# Patient Record
Sex: MALE | Race: OTHER | NOT HISPANIC OR LATINO | URBAN - METROPOLITAN AREA
[De-identification: names, ages, dates, MRNs, and addresses within clinical notes are randomized per-mention and may not be internally consistent; named-entity substitution may affect disease eponyms.]

---

## 2021-07-01 ENCOUNTER — INPATIENT (INPATIENT)
Facility: HOSPITAL | Age: 71
LOS: 2 days | Discharge: HOME | DRG: 287 | End: 2021-07-04
Attending: INTERNAL MEDICINE | Admitting: INTERNAL MEDICINE
Payer: COMMERCIAL

## 2021-07-01 VITALS
OXYGEN SATURATION: 88 % | WEIGHT: 210.1 LBS | RESPIRATION RATE: 17 BRPM | HEIGHT: 71 IN | HEART RATE: 63 BPM | SYSTOLIC BLOOD PRESSURE: 115 MMHG | DIASTOLIC BLOOD PRESSURE: 79 MMHG | TEMPERATURE: 97 F

## 2021-07-01 DIAGNOSIS — Z89.029 ACQUIRED ABSENCE OF UNSPECIFIED FINGER(S): Chronic | ICD-10-CM

## 2021-07-01 LAB
ALBUMIN SERPL ELPH-MCNC: 4.5 G/DL — SIGNIFICANT CHANGE UP (ref 3.3–5)
ALP SERPL-CCNC: 86 U/L — SIGNIFICANT CHANGE UP (ref 40–120)
ALT FLD-CCNC: 15 U/L — SIGNIFICANT CHANGE UP (ref 10–45)
ANION GAP SERPL CALC-SCNC: 15 MMOL/L — SIGNIFICANT CHANGE UP (ref 5–17)
AST SERPL-CCNC: 14 U/L — SIGNIFICANT CHANGE UP (ref 10–40)
BASOPHILS # BLD AUTO: 0.05 K/UL — SIGNIFICANT CHANGE UP (ref 0–0.2)
BASOPHILS NFR BLD AUTO: 0.7 % — SIGNIFICANT CHANGE UP (ref 0–2)
BILIRUB SERPL-MCNC: 0.2 MG/DL — SIGNIFICANT CHANGE UP (ref 0.2–1.2)
BUN SERPL-MCNC: 38 MG/DL — HIGH (ref 7–23)
CALCIUM SERPL-MCNC: 9.4 MG/DL — SIGNIFICANT CHANGE UP (ref 8.4–10.5)
CHLORIDE SERPL-SCNC: 99 MMOL/L — SIGNIFICANT CHANGE UP (ref 96–108)
CO2 SERPL-SCNC: 19 MMOL/L — LOW (ref 22–31)
CREAT SERPL-MCNC: 1.36 MG/DL — HIGH (ref 0.5–1.3)
EOSINOPHIL # BLD AUTO: 0.34 K/UL — SIGNIFICANT CHANGE UP (ref 0–0.5)
EOSINOPHIL NFR BLD AUTO: 4.4 % — SIGNIFICANT CHANGE UP (ref 0–6)
GLUCOSE SERPL-MCNC: 96 MG/DL — SIGNIFICANT CHANGE UP (ref 70–99)
HCT VFR BLD CALC: 45 % — SIGNIFICANT CHANGE UP (ref 39–50)
HGB BLD-MCNC: 14.8 G/DL — SIGNIFICANT CHANGE UP (ref 13–17)
IMM GRANULOCYTES NFR BLD AUTO: 0.5 % — SIGNIFICANT CHANGE UP (ref 0–1.5)
LYMPHOCYTES # BLD AUTO: 1.85 K/UL — SIGNIFICANT CHANGE UP (ref 1–3.3)
LYMPHOCYTES # BLD AUTO: 24.2 % — SIGNIFICANT CHANGE UP (ref 13–44)
MCHC RBC-ENTMCNC: 29.7 PG — SIGNIFICANT CHANGE UP (ref 27–34)
MCHC RBC-ENTMCNC: 32.9 GM/DL — SIGNIFICANT CHANGE UP (ref 32–36)
MCV RBC AUTO: 90.2 FL — SIGNIFICANT CHANGE UP (ref 80–100)
MONOCYTES # BLD AUTO: 0.65 K/UL — SIGNIFICANT CHANGE UP (ref 0–0.9)
MONOCYTES NFR BLD AUTO: 8.5 % — SIGNIFICANT CHANGE UP (ref 2–14)
NEUTROPHILS # BLD AUTO: 4.73 K/UL — SIGNIFICANT CHANGE UP (ref 1.8–7.4)
NEUTROPHILS NFR BLD AUTO: 61.7 % — SIGNIFICANT CHANGE UP (ref 43–77)
NRBC # BLD: 0 /100 WBCS — SIGNIFICANT CHANGE UP (ref 0–0)
NT-PROBNP SERPL-SCNC: 62 PG/ML — SIGNIFICANT CHANGE UP (ref 0–300)
PLATELET # BLD AUTO: 226 K/UL — SIGNIFICANT CHANGE UP (ref 150–400)
POTASSIUM SERPL-MCNC: 4.6 MMOL/L — SIGNIFICANT CHANGE UP (ref 3.5–5.3)
POTASSIUM SERPL-SCNC: 4.6 MMOL/L — SIGNIFICANT CHANGE UP (ref 3.5–5.3)
PROT SERPL-MCNC: 7.2 G/DL — SIGNIFICANT CHANGE UP (ref 6–8.3)
RBC # BLD: 4.99 M/UL — SIGNIFICANT CHANGE UP (ref 4.2–5.8)
RBC # FLD: 11.9 % — SIGNIFICANT CHANGE UP (ref 10.3–14.5)
SARS-COV-2 RNA SPEC QL NAA+PROBE: SIGNIFICANT CHANGE UP
SODIUM SERPL-SCNC: 133 MMOL/L — LOW (ref 135–145)
TROPONIN T, HIGH SENSITIVITY RESULT: 12 NG/L — SIGNIFICANT CHANGE UP (ref 0–51)
TROPONIN T, HIGH SENSITIVITY RESULT: 13 NG/L — SIGNIFICANT CHANGE UP (ref 0–51)
WBC # BLD: 7.66 K/UL — SIGNIFICANT CHANGE UP (ref 3.8–10.5)
WBC # FLD AUTO: 7.66 K/UL — SIGNIFICANT CHANGE UP (ref 3.8–10.5)

## 2021-07-01 PROCEDURE — 99285 EMERGENCY DEPT VISIT HI MDM: CPT | Mod: 25

## 2021-07-01 PROCEDURE — 93010 ELECTROCARDIOGRAM REPORT: CPT

## 2021-07-01 PROCEDURE — 71046 X-RAY EXAM CHEST 2 VIEWS: CPT | Mod: 26

## 2021-07-01 RX ORDER — ASPIRIN/CALCIUM CARB/MAGNESIUM 324 MG
325 TABLET ORAL DAILY
Refills: 0 | Status: DISCONTINUED | OUTPATIENT
Start: 2021-07-01 | End: 2021-07-04

## 2021-07-01 RX ORDER — LISINOPRIL 2.5 MG/1
12.5 TABLET ORAL AT BEDTIME
Refills: 0 | Status: DISCONTINUED | OUTPATIENT
Start: 2021-07-01 | End: 2021-07-04

## 2021-07-01 RX ORDER — CILOSTAZOL 100 MG/1
50 TABLET ORAL ONCE
Refills: 0 | Status: COMPLETED | OUTPATIENT
Start: 2021-07-01 | End: 2021-07-01

## 2021-07-01 RX ORDER — ATORVASTATIN CALCIUM 80 MG/1
10 TABLET, FILM COATED ORAL AT BEDTIME
Refills: 0 | Status: DISCONTINUED | OUTPATIENT
Start: 2021-07-01 | End: 2021-07-03

## 2021-07-01 RX ORDER — SODIUM CHLORIDE 9 MG/ML
1000 INJECTION INTRAMUSCULAR; INTRAVENOUS; SUBCUTANEOUS ONCE
Refills: 0 | Status: COMPLETED | OUTPATIENT
Start: 2021-07-01 | End: 2021-07-01

## 2021-07-01 RX ADMIN — Medication 325 MILLIGRAM(S): at 23:31

## 2021-07-01 RX ADMIN — SODIUM CHLORIDE 1000 MILLILITER(S): 9 INJECTION INTRAMUSCULAR; INTRAVENOUS; SUBCUTANEOUS at 17:38

## 2021-07-01 RX ADMIN — LISINOPRIL 12.5 MILLIGRAM(S): 2.5 TABLET ORAL at 23:32

## 2021-07-01 RX ADMIN — CILOSTAZOL 50 MILLIGRAM(S): 100 TABLET ORAL at 23:47

## 2021-07-01 NOTE — ED PROVIDER NOTE - PHYSICAL EXAMINATION
GENERAL: well appearing in no acute distress, non-toxic appearing  HEAD: normocephalic, atraumatic  HENT: airway intact, neck supple  EYES: normal conjunctiva, PERRL  CARDIAC: regular rate and rhythm, normal S1S2, no appreciable murmurs, 2+ pulses in UE/LE b/l  PULM: normal breath sounds, clear to ascultation bilaterally, no rales, rhonchi, wheezing  GI: abdomen nondistended, soft, nontender, no guarding, rebound tenderness  NEURO: no focal motor or sensory deficits, CN2-12 intact, normal gait, AAOx3  MSK: no peripheral edema, no calf tenderness b/l  SKIN: well-perfused, extremities warm, no visible rashes  PSYCH: appropriate mood and affect

## 2021-07-01 NOTE — ED CDU PROVIDER INITIAL DAY NOTE - PROGRESS NOTE DETAILS
Pt comfortable. No complaints. Vital signs stable. echo normal. stress test shows ischemia. cardiology consult called. pts daughter at bedside. -Letty Francisco PA-C Pt comfortable. No complaints. Vital signs stable. Will continue to observe and reassess. Awaiting echo and stress test. -Letty Francisco PA-C pt seen by cardiology fellow. recommends cardiac cath. will admit pt. discussed with Dr. Denis. -Letty Francisco PA-C

## 2021-07-01 NOTE — ED CDU PROVIDER INITIAL DAY NOTE - NS ED ROS FT
Constitutional: No fever or chills  Eyes: No visual changes  CV: +chest pain No lower extremity edema  Resp: +Chronic SOB no cough  GI: No abd pain. +nausea No vomiting. + diarrhea.   : No dysuria, hematuria.   MSK: No musculoskeletal pain  Skin: No rash  Psych: No complaints   Neuro: +lightheadedness No headache. No numbness or tingling.

## 2021-07-01 NOTE — ED CDU PROVIDER INITIAL DAY NOTE - FAMILY HISTORY
Mother  Still living? Unknown  Family history of CHF (congestive heart failure), Age at diagnosis: Age Unknown

## 2021-07-01 NOTE — ED CDU PROVIDER INITIAL DAY NOTE - PHYSICAL EXAMINATION
GEN: Well Appearing, Nontoxic, NAD  HEENT: NC/AT, Symm Facies. PERRL  CV: No JVD/Bruits or stridor;  +S1S2, RRR w/o m/g/r  RESP: CTAB w/o w/r/r  ABD: Soft, nt/nd  EXT/MSK: No lower extremity edema or calf tenderness.+ equal palpable pulses. FROMx4  PSYCH: Appropriate mood and affect   Neuro: Grossly intact, AOX3 with normal speech GEN: Well Appearing, Nontoxic, NAD  HEENT: NC/AT, Symm Facies. EOMI  CV: No JVD/Bruits or stridor;  +S1S2, RRR w/o m/g/r  RESP: CTAB w/o w/r/r  ABD: Soft, nt/nd  EXT/MSK: No lower extremity edema or calf tenderness.+ equal palpable pulses. FROMx4  PSYCH: Appropriate mood and affect   Neuro: Grossly intact, AOX3 with normal speech

## 2021-07-01 NOTE — ED CDU PROVIDER INITIAL DAY NOTE - ATTENDING CONTRIBUTION TO CARE
Attending Statement (PIPE Ellison MD):    HPI: 70Y/O M with h/o COPD, ETOH abuse, and CAD c/o substernal chest pain occurring intermittently;   not a/w resting, exertion or food for the past 4 days; improving since started.  no fever/chills, no cough, no SOB. no n/v. No home Oxygen use.  No leg swelling.    PI# [id number]    Review of Systems:  -General: no fever or chills  -ENT: no congestion, no difficulty swallowing  -Pulmonary: no cough, no shortness of breath  -Cardiac: no chest pain, no palpitations  -Gastrointestinal: no abdominal pain, no nausea, no vomiting, and no diarrhea.  -Genitourinary: no blood or pain with urination  -Musculoskeletal: no back or neck pain  -Skin: no rashes  -Endocrine: No h/o diabetes or thyroid disease  -Neurologic: No focal weakness or numbness    All else negative unless otherwise specified elsewhere in this note.    PSH/PMH as noted above    On Physical Exam:  General: well appearing, in NAD, speaking clearly in full sentences and without difficulty; cooperative with exam  HEENT: airway patent  Neck: no neck tenderness, no nuchal rigidity  Cardiac: normal s1, s2; RRR; no MGR  Lungs: CTABL  Abdomen: soft nontender/nondistended  : no bladder tenderness or distension  Skin: intact, no rash  Extremities: no peripheral edema, no gross deformities  Neuro: no gross neurologic deficits    MDM: 72y/o M with h/o COPD, CAD, HTN, c/o chest pain:  Patient's description of chest pain, including lack of pleuritic nature, minimal risk factors and overall clinical picture are not consistent with a pulmonary embolism. The patient's clinical presentation, including type/description of pain, stable vitals and overall clinical picture are not consistent with an acute aortic dissection. No fever, cough or other symptoms suggestive of an acute infectious etiology.  Some concern for ACS/angina; will obtain labs: cbc (to evaluate for leukocytosis or anemia), CMP (to evaluate for electrolyte abnormalities or renal/liver dysfunction) and troponin.    ED course: patient remains stable; trop not significantly elevated x 2; cxr clear lungs; however, initially wanting to leave, then had episode of chest discomfort when walking to bathroom and agreed to stay in CDU for further evaluation: observation, tele monitoring and stress/echo.

## 2021-07-01 NOTE — ED PROVIDER NOTE - OBJECTIVE STATEMENT
72yo M w/ history of HTN, HLD, ETOH abuse, CAD s/p cath but no stents in 2018, and COPD coming to the ED for left sided non radiating intermittent dull/sharp chest pain x4 days. Polish speaking man who prefers daughter to translate. Pain is non specific and occurs when at rest or upon exertion. Pt also notes occasional lightheadedness, and feeling "off balance" with an unsteady gait, along with nausea,  Currently asymptomatic. Notes SOB occasionally but had symptomatic relief when using albuterol inhalar. Denies any headache, changes in vision, abdominal pain, or dysuria.

## 2021-07-01 NOTE — ED ADULT NURSE NOTE - OBJECTIVE STATEMENT
71yr old male w/ pmhx of BPH, HTN, HLD, ETOH abuse and COPD presents to ED c/o chest pain. Pt accompanied by daughter who states pt has been experiencing Mid-left sided chest pain for the past 4 days. 71yr old male w/ pmhx of BPH, HTN, HLD, ETOH abuse and COPD presents to ED c/o chest pain. Pt accompanied by daughter who states pt has been experiencing Mid-left sided chest pain for the past 4 days. Pt states the CP feels like a non-radiating intermittent dull pain, that occurs when at rest or upon exertion. Pt also endorses lightheadedness, and feeling "off balance" with an unsteady gait, along with nausea, pt states these symptoms occur intermittently and he is not currently experiencing them. Pt states he did experience some SOB, but states that was due to his non compliance with his inhaler for COPD. Pt denies Vomiting, diarrhea, numbness, tingling of extremities, fevers, chills, syncope, diaphoresis. Pt placed on CM, with a HR of 67 in NSR. Pt assessed by MD, bed lowered and locked, call bell within reach. will reassess

## 2021-07-01 NOTE — ED PROVIDER NOTE - PROGRESS NOTE DETAILS
Gurjit, PGY2: Patient reassessed and noting he's had intermittent chest pain while in the ED; will get 2nd trop and reassess. Offered CDU vs admission for stress/echo but patient is reluctant to stay Attending note (Scot): The patient is clinically sober, A&Ox3, free from distracting injury.  Throughout our interactions in the ED today, the pt has demonstrated concrete thinking/reasoning, has maintained an orderly/reasonable conversation, appears to have intact insight/judgment/reason and therefore in our opinion has capacity to make decisions.  Given the patient's presentation, we have communicated our concern for coronary artery disease, acs, heart attack, death.  The patient has verbalized an understanding of our concerns and still requests to leave against medical advice.  We have discussed the range of possible diagnoses, potential testing & treatment options.  We’ve made  numerous efforts to prevent the patient from leaving AMA.  Our discussions included the potential outcomes of leaving AMA, including worsening of their condition, becoming permanently disabled/in pain/critically ill, or death.  Despite these efforts, we were unable to convince the patient to stay.  The patient is refusing any  further care and is leaving against medical advice.  We have answered all questions and have implored the patient to return ASAP to complete the work up or with ANY worsening or new concerning symptoms. Attending note (Scot): The patient is clinically sober, A&Ox3, free from distracting injury.  Throughout our interactions in the ED today, the pt has demonstrated concrete thinking/reasoning, has maintained an orderly/reasonable conversation, appears to have intact insight/judgment/reason and therefore in our opinion has capacity to make decisions.  Given the patient's presentation, we have communicated our concern for coronary artery disease, acs, heart attack, death.  The patient has verbalized an understanding of our concerns and still requests to leave against medical advice.  We have discussed the range of possible diagnoses, potential testing & treatment options.  We’ve made  numerous efforts to prevent the patient from leaving AMA.  Our discussions included the potential outcomes of leaving AMA, including worsening of their condition, becoming permanently disabled/in pain/critically ill, or death.  Despite these efforts, we were unable to convince the patient to stay.  The patient is refusing any  further care and is leaving against medical advice.  We have answered all questions and have implored the patient to return ASAP to complete the work up or with ANY worsening or new concerning symptoms.    PI# [id number] KATELYN Ramires (Resident) - pt ambulated to bathroom, had CP, agreeable now to staying in CDU for stress and echo.

## 2021-07-01 NOTE — ED PROVIDER NOTE - ATTENDING CONTRIBUTION TO CARE
Attending Statement (PIPE Ellison MD):    HPI: 70Y/O M with h/o COPD, ETOH abuse, and CAD c/o substernal chest pain occurring intermittently;   not a/w resting, exertion or food for the past 4 days; improving since started.  no fever/chills, no cough, no SOB. no n/v. No home Oxygen use.  No leg swelling.    PI# [id number]    Review of Systems:  -General: no fever or chills  -ENT: no congestion, no difficulty swallowing  -Pulmonary: no cough, no shortness of breath  -Cardiac: no chest pain, no palpitations  -Gastrointestinal: no abdominal pain, no nausea, no vomiting, and no diarrhea.  -Genitourinary: no blood or pain with urination  -Musculoskeletal: no back or neck pain  -Skin: no rashes  -Endocrine: No h/o diabetes or thyroid disease  -Neurologic: No focal weakness or numbness    All else negative unless otherwise specified elsewhere in this note.    PSH/PMH as noted above    On Physical Exam:  General: well appearing, in NAD, speaking clearly in full sentences and without difficulty; cooperative with exam  HEENT: airway patent  Neck: no neck tenderness, no nuchal rigidity  Cardiac: normal s1, s2; RRR; no MGR  Lungs: CTABL  Abdomen: soft nontender/nondistended  : no bladder tenderness or distension  Skin: intact, no rash  Extremities: no peripheral edema, no gross deformities  Neuro: no gross neurologic deficits    MDM: 70y/o M with h/o COPD, CAD, HTN, c/o chest pain:  Patient's description of chest pain, including lack of pleuritic nature, minimal risk factors and overall clinical picture are not consistent with a pulmonary embolism. The patient's clinical presentation, including type/description of pain, stable vitals and overall clinical picture are not consistent with an acute aortic dissection. No fever, cough or other symptoms suggestive of an acute infectious etiology.  Some concern for ACS/angina; will obtain labs: cbc (to evaluate for leukocytosis or anemia), CMP (to evaluate for electrolyte abnormalities or renal/liver dysfunction) and troponin. Attending Statement (PIPE Ellison MD):    HPI: 70Y/O M with h/o COPD, ETOH abuse, and CAD c/o substernal chest pain occurring intermittently;   not a/w resting, exertion or food for the past 4 days; improving since started.  no fever/chills, no cough, no SOB. no n/v. No home Oxygen use.  No leg swelling.    PI# [id number]    Review of Systems:  -General: no fever or chills  -ENT: no congestion, no difficulty swallowing  -Pulmonary: no cough, no shortness of breath  -Cardiac: no chest pain, no palpitations  -Gastrointestinal: no abdominal pain, no nausea, no vomiting, and no diarrhea.  -Genitourinary: no blood or pain with urination  -Musculoskeletal: no back or neck pain  -Skin: no rashes  -Endocrine: No h/o diabetes or thyroid disease  -Neurologic: No focal weakness or numbness    All else negative unless otherwise specified elsewhere in this note.    PSH/PMH as noted above    On Physical Exam:  General: well appearing, in NAD, speaking clearly in full sentences and without difficulty; cooperative with exam  HEENT: airway patent  Neck: no neck tenderness, no nuchal rigidity  Cardiac: normal s1, s2; RRR; no MGR  Lungs: CTABL  Abdomen: soft nontender/nondistended  : no bladder tenderness or distension  Skin: intact, no rash  Extremities: no peripheral edema, no gross deformities  Neuro: no gross neurologic deficits    MDM: 70y/o M with h/o COPD, CAD, HTN, c/o chest pain:  Patient's description of chest pain, including lack of pleuritic nature, minimal risk factors and overall clinical picture are not consistent with a pulmonary embolism. The patient's clinical presentation, including type/description of pain, stable vitals and overall clinical picture are not consistent with an acute aortic dissection. No fever, cough or other symptoms suggestive of an acute infectious etiology.  Some concern for ACS/angina; will obtain labs: cbc (to evaluate for leukocytosis or anemia), CMP (to evaluate for electrolyte abnormalities or renal/liver dysfunction) and troponin.    ED course: patient remains stable; trop not significantly elevated x 2; cxr clear lungs; however, initially wanting to leave, then had episode of chest discomfort when walking to bathroom and agreed to stay in CDU for further evaluation: observation, tele monitoring and stress/echo.

## 2021-07-01 NOTE — ED ADULT NURSE NOTE - NSIMPLEMENTINTERV_GEN_ALL_ED
Implemented All Fall Risk Interventions:  [place] to call system. Call bell, personal items and telephone within reach. Instruct patient to call for assistance. Room bathroom lighting operational. Non-slip footwear when patient is off stretcher. Physically safe environment: no spills, clutter or unnecessary equipment. Stretcher in lowest position, wheels locked, appropriate side rails in place. Provide visual cue, wrist band, yellow gown, etc. Monitor gait and stability. Monitor for mental status changes and reorient to person, place, and time. Review medications for side effects contributing to fall risk. Reinforce activity limits and safety measures with patient and family.

## 2021-07-01 NOTE — ED PROVIDER NOTE - NS ED ROS FT
General: denies fever, chills  HENT: denies nasal congestion, rhinorrhea  Eyes: denies visual changes, blurred vision  CV: positive chest pain, no palpitations  Resp: positive difficulty breathing, no cough  Abdominal: denies nausea, vomiting, diarrhea, abdominal pain  : denies urinary pain or discharge  MSK: denies muscle aches, leg swelling  Neuro: denies headaches, numbness, tingling  Skin: denies rashes, bruises

## 2021-07-01 NOTE — ED PROVIDER NOTE - CLINICAL SUMMARY MEDICAL DECISION MAKING FREE TEXT BOX
1yo M w/ history of HTN, HLD, ETOH abuse and COPD coming to the ED for left sided non radiating intermittent dull/sharp chest pain x4 days; likely angina vs unstable angina. Will evaluate for ACS; likely would benefit for echo + stress test as last cardiologist appointment was over 3 years ago.  Plan: labs + ekg + chest xray + IV fluid hydration 72yo M w/ history of HTN, HLD, ETOH abuse and COPD coming to the ED for left sided non radiating intermittent dull/sharp chest pain x4 days; likely angina vs unstable angina. Will evaluate for ACS; likely would benefit for echo + stress test as last cardiologist appointment was over 3 years ago.  Plan: labs + ekg + chest xray + IV fluid hydration

## 2021-07-01 NOTE — ED CDU PROVIDER INITIAL DAY NOTE - OBJECTIVE STATEMENT
70yo M w/ history of HTN, HLD, ETOH abuse (last drink 1 week ago, denies history of withdrawal), COPD, daily smoker (declines nicotine patch) CAD s/p cath but no stents in 2018, and COPD coming to the ED for left sided non radiating intermittent dull/sharp chest pain x4 days. Polish speaking man who prefers daughter to translate. Daughter Iva [phone]. Pain is non specific intermittent and occurs when at rest or upon exertion. Pt also notes occasional lightheadedness, and feeling "off balance" with an unsteady gait, along with nausea, and looser stools for the last 4 days.  States that his systolic BP in at home was in the 70s today. Currently asymptomatic. Notes SOB occasionally but had symptomatic relief when using albuterol inhalar, unchanged from baseline. Denies any headache, changes in vision, abdominal pain, or dysuria. No cardiologist. PCP Kati.   ED course: Vitals stable. Trop 13-12, BNP 62. Patient was going to AMA but then had another episode of chest pain, resolving shortly afterwards, then agreeing to stay for echo and stress test. Plan for tele monitoring and cardiac work up in CDU.

## 2021-07-01 NOTE — ED ADULT NURSE REASSESSMENT NOTE - NS ED NURSE REASSESS COMMENT FT1
Pt. received from TRISHA Costa from [facility] 3 into red 39.  iPad being used at bedside, ID [id number], to communicate in polish with patient. Pt. AOx4, ambulates independently, reporting intermittent chest pain. Sinus bradycardia on cardiac monitor, all other VSS. Made aware of plan of care for echo in the am. Pt. complaining about cardiac monitor wires, educated on how to disconnect and reconnect monitor so he can ambulate and use the restroom when necessary. Will continue to reassess.

## 2021-07-01 NOTE — ED ADULT NURSE REASSESSMENT NOTE - NS ED NURSE REASSESS COMMENT FT1
Assumed care from RN Kristina @ 1900. On assessment, pt is A&Ox3 speaking coherently in Polish, family member at bedside translating as per pt request. ID verified. Pt c/o mild back pain "from stretcher". No other c/o pain/discomfort. Denies any chest pain/SOB. Does not appear to be in any acute distress. On cardiac monitor, indicating sinus bradycardia. Fall precautions in place, pt and family educated. PIV patent and flushing w/o difficulty, no s/s infection/infiltration. Rpt troponin drawn, pt and family member updated on plan of care, all questions answered, verbalize understanding. Safety and comfort measures provided. Bed locked and in lowest position, side rails up for safety. Call bell within reach. Assumed care from RN Kristina @ 1900. On assessment, pt is A&Ox3 speaking coherently in Polish, family member at bedside translating as per pt request. ID verified. Pt c/o mild back pain "from stretcher". No other c/o pain/discomfort. Denies any chest pain/SOB. Does not appear to be in any acute distress. On cardiac monitor and continuous pulse oximetry, indicating sinus bradycardia. Fall precautions in place, pt and family educated. PIV patent and flushing w/o difficulty, no s/s infection/infiltration. Rpt troponin drawn, pt and family member updated on plan of care, all questions answered, verbalize understanding. Safety and comfort measures provided. Bed locked and in lowest position, side rails up for safety. Call bell within reach.

## 2021-07-02 DIAGNOSIS — R07.9 CHEST PAIN, UNSPECIFIED: ICD-10-CM

## 2021-07-02 LAB
A1C WITH ESTIMATED AVERAGE GLUCOSE RESULT: 5.7 % — HIGH (ref 4–5.6)
CHOLEST SERPL-MCNC: 158 MG/DL — SIGNIFICANT CHANGE UP
ESTIMATED AVERAGE GLUCOSE: 117 MG/DL — HIGH (ref 68–114)
HDLC SERPL-MCNC: 50 MG/DL — SIGNIFICANT CHANGE UP
LIPID PNL WITH DIRECT LDL SERPL: 85 MG/DL — SIGNIFICANT CHANGE UP
NON HDL CHOLESTEROL: 108 MG/DL — SIGNIFICANT CHANGE UP
TRIGL SERPL-MCNC: 117 MG/DL — SIGNIFICANT CHANGE UP

## 2021-07-02 PROCEDURE — 99236 HOSP IP/OBS SAME DATE HI 85: CPT

## 2021-07-02 PROCEDURE — 93306 TTE W/DOPPLER COMPLETE: CPT | Mod: 26

## 2021-07-02 PROCEDURE — 78452 HT MUSCLE IMAGE SPECT MULT: CPT | Mod: 26

## 2021-07-02 PROCEDURE — 93018 CV STRESS TEST I&R ONLY: CPT

## 2021-07-02 PROCEDURE — 93016 CV STRESS TEST SUPVJ ONLY: CPT

## 2021-07-02 RX ORDER — ALFUZOSIN HYDROCHLORIDE 10 MG/1
1 TABLET, EXTENDED RELEASE ORAL
Qty: 0 | Refills: 0 | DISCHARGE

## 2021-07-02 RX ORDER — LISINOPRIL 2.5 MG/1
12.5 TABLET ORAL
Qty: 0 | Refills: 0 | DISCHARGE

## 2021-07-02 RX ORDER — CILOSTAZOL 100 MG/1
1 TABLET ORAL
Qty: 0 | Refills: 0 | DISCHARGE

## 2021-07-02 RX ORDER — ASPIRIN/CALCIUM CARB/MAGNESIUM 324 MG
0 TABLET ORAL
Qty: 0 | Refills: 0 | DISCHARGE

## 2021-07-02 RX ADMIN — LISINOPRIL 12.5 MILLIGRAM(S): 2.5 TABLET ORAL at 22:06

## 2021-07-02 RX ADMIN — ATORVASTATIN CALCIUM 10 MILLIGRAM(S): 80 TABLET, FILM COATED ORAL at 20:09

## 2021-07-02 RX ADMIN — Medication 325 MILLIGRAM(S): at 20:08

## 2021-07-02 NOTE — ED CDU PROVIDER SUBSEQUENT DAY NOTE - PHYSICAL EXAMINATION
GEN: Well Appearing, Nontoxic, NAD  HEENT: NC/AT, Symm Facies. PERRL  CV: No JVD/Bruits or stridor;  +S1S2, RRR w/o m/g/r  RESP: CTAB w/o w/r/r  ABD: Soft, nt/nd  EXT/MSK: No lower extremity edema or calf tenderness.+ equal palpable pulses. FROMx4  PSYCH: Appropriate mood and affect   Neuro: Grossly intact, AOX3 with normal speech

## 2021-07-02 NOTE — ED ADULT NURSE REASSESSMENT NOTE - NS ED NURSE REASSESS COMMENT FT1
Pt. remains refusing vital signs at this time. Becoming angry when EDT attempted temperature and vital sign assessments. JOSE Fisher made aware. Will continue to reassess.

## 2021-07-02 NOTE — CONSULT NOTE ADULT - SUBJECTIVE AND OBJECTIVE BOX
Patient seen and evaluated at bedside    Reason for consult:  positive stress test    HPI:  71M w/ HTN, HLD, ETOH abuse, CAD s/p cath at [facility] in 2018 (Daughter, Iva used to be a cath PA there; has known LAD, CX and RCA lesions, but 20-30%, did not get intervened on) and COPD who presented with left sided non radiating chest pain for 4 days that only occurs with exertion. He has had chest pain at baseline due to his COPD, but this pain was different in nature in regards to intensity. He was ruled out for ACS, ECG showed NSR. He underwent stress test today that showed mild to moderate defects in inferior and inferoseptal, basal anterior, basal anteroseptal walls that are reversible consistent with ischemia. Currently, he is asymptomatic. Echo today showed normal LV function without wall motion abnormalities.       PMHx:   Hypertension    COPD (chronic obstructive pulmonary disease)    ETOH abuse        PSHx:   Finger amputee        Allergies:  No Known Allergies      Home Meds:  ASA  Statin  ACE-i    Current Medications:   aspirin enteric coated 325 milliGRAM(s) Oral daily  atorvastatin 10 milliGRAM(s) Oral at bedtime  lisinopril 12.5 milliGRAM(s) Oral at bedtime    FAMILY HISTORY:  Family history of CHF (congestive heart failure) (Mother)    Social History: NA    Review of Systems:  REVIEW OF SYSTEMS:  CONSTITUTIONAL: No weakness, fevers or chills  EYES/ENT: No visual changes;  No dysphagia  NECK: No pain or stiffness  RESPIRATORY: No cough, wheezing, hemoptysis; No shortness of breath  CARDIOVASCULAR: see above  GASTROINTESTINAL: No abdominal or epigastric pain. No nausea, vomiting, or hematemesis; No diarrhea or constipation. No melena or hematochezia.  BACK: No back pain  GENITOURINARY: No dysuria, frequency or hematuria  NEUROLOGICAL: No numbness or weakness  SKIN: No itching, burning, rashes, or lesions   All other review of systems is negative unless indicated above.    [x ] All other systems negative  [ ] Unable to assess ROS due to    Physical Exam:  T(F): 97.5 (-), Max: 98.2 ()  HR: 77 () (58 - 77)  BP: 101/74 () (96/60 - 149/71)  RR: 22 ()  SpO2: 96% ()  GENERAL: No acute distress, well-developed  HEAD:  Atraumatic, Normocephalic  ENT: EOMI, PERRLA, conjunctiva and sclera clear, Neck supple, No JVD, moist mucosa  CHEST/LUNG: Clear to auscultation bilaterally; No wheeze, equal breath sounds bilaterally   BACK: No spinal tenderness  HEART: Regular rate and rhythm; No murmurs, rubs, or gallops  ABDOMEN: Soft, Nontender, Nondistended; Bowel sounds present  EXTREMITIES:  No clubbing, cyanosis, or edema  PSYCH: Nl behavior, nl affect  NEUROLOGY: AAOx3, non-focal, cranial nerves intact  SKIN: Normal color, No rashes or lesions  LINES:    Cardiovascular Diagnostic Testing:    < from: Transthoracic Echocardiogram (21 @ 09:56) >  1. Normal left ventricular internal dimensions and wall  thicknesses.  2. Normal left ventricular systolic function. No segmental  wall motion abnormalities.  3. Normal right ventricular size and systolic function.  ------------------------------------------------------------------------  Confirmed on  2021 - 13:45:33 by MARIA LUISA Steele  ------------------------------------------------------------------------    < end of copied text >  < from: Nuclear Stress Test-Pharmacologic (Nuclear Stress Test-Pharmacologic .) (21 @ 13:45) >  IMPRESSIONS:Abnormal Study  * Chest Pain: No chest pain with administration of  Regadenoson.  * Symptom: No Symptom.  * HR Response: Appropriate.  * BP Response: Appropriate.  * Heart Rhythm: Normal.  * Baseline ECG: Normal.  * ECG Abnormalities: None.  * ECG Changes: No ischemic changes.  * Arrhythmia: None.  * The left ventricle was normal in size. There are medium  sized, mild to moderate defects in inferior and  inferoseptal, basal anterior, basal anteroseptal walls  that are reversible consistent with ischemia.  * Gated wall motion analysis is performed, and shows  normal wall motion with post stress LVEF of 67% and post  stress LVEDV of  95 ml.  ------------------------------------------------------------------------  Confirmed on  2021 - 17:19:53 by Wesley Styles M.D.  ------------------------------------------------------------------------    < end of copied text >    CXR: Personally reviewed    Labs: Personally reviewed                        14.8   7.66  )-----------( 226      ( 2021 17:30 )             45.0     07-    133<L>  |  99  |  38<H>  ----------------------------<  96  4.6   |  19<L>  |  1.36<H>    Ca    9.4      2021 17:30    TPro  7.2  /  Alb  4.5  /  TBili  0.2  /  DBili  x   /  AST  14  /  ALT  15  /  AlkPhos  86  07-      Serum Pro-Brain Natriuretic Peptide: 62 pg/mL ( @ 17:30)    Total Cholesterol: 158  LDL: --  HDL: 50  T

## 2021-07-02 NOTE — H&P ADULT - HISTORY OF PRESENT ILLNESS
71M w/ HTN, HLD, ETOH abuse, CAD s/p cath at [facility] in 2018 (has known LAD, CX and RCA lesions, but 20-30%, did not get intervened on) and COPD who presented with left sided non radiating chest pain for 4 days that only occurs with exertion. He has had chest pain at baseline due to his COPD, but this pain was different in nature in regards to intensity. He was ruled out for ACS, ECG showed NSR. He underwent stress test today that showed mild to moderate defects in inferior and inferoseptal, basal anterior, basal anteroseptal walls that are reversible consistent with ischemia. Currently, he is asymptomatic. Echo today showed normal LV function without wall motion abnormalities.

## 2021-07-02 NOTE — ED ADULT NURSE REASSESSMENT NOTE - COMFORT CARE
ambulated to bathroom/darkened lights/plan of care explained/po fluids offered/repositioned/warm blanket provided
ambulated to bathroom/plan of care explained/po fluids offered/wait time explained/warm blanket provided

## 2021-07-02 NOTE — ED ADULT NURSE REASSESSMENT NOTE - NS ED NURSE REASSESS COMMENT FT1
Report given to Elena RICO on 4mon 414d VSS, no complaints at this time, pending transport, pt has active order for off tele to floor. Safety and comfort maintained.

## 2021-07-02 NOTE — ED CDU PROVIDER DISPOSITION NOTE - CLINICAL COURSE
72yo M w/ history of HTN, HLD, ETOH abuse (last drink 1 week ago, denies history of withdrawal), COPD, daily smoker (declines nicotine patch) CAD s/p cath but no stents in 2018, and COPD coming to the ED for left sided non radiating intermittent dull/sharp chest pain x4 days. Pain is non specific intermittent and occurs when at rest or upon exertion. Pt also notes occasional lightheadedness, and feeling "off balance" with an unsteady gait, along with nausea, and looser stools for the last 4 days.  States that his systolic BP in at home was in the 70s today. Currently asymptomatic. Notes SOB occasionally but had symptomatic relief when using albuterol inhalar, unchanged from baseline. Denies any headache, changes in vision, abdominal pain, or dysuria. No cardiologist. PCP Kati.   ED course: Vitals stable. Trop 13-12, BNP 62. Patient was going to AMA but then had another episode of chest pain, resolving shortly afterwards, then agreeing to stay for echo and stress test. Plan for tele monitoring and cardiac work up in CDU. in cdu pt had echo which was normal, stress test showed ischemia. pt was seen by cardiology 70yo M w/ history of HTN, HLD, ETOH abuse (last drink 1 week ago, denies history of withdrawal), COPD, daily smoker (declines nicotine patch) CAD s/p cath but no stents in 2018, and COPD coming to the ED for left sided non radiating intermittent dull/sharp chest pain x4 days. Pain is non specific intermittent and occurs when at rest or upon exertion. Pt also notes occasional lightheadedness, and feeling "off balance" with an unsteady gait, along with nausea, and looser stools for the last 4 days.  States that his systolic BP in at home was in the 70s today. Currently asymptomatic. Notes SOB occasionally but had symptomatic relief when using albuterol inhalar, unchanged from baseline. Denies any headache, changes in vision, abdominal pain, or dysuria. No cardiologist. PCP Kati.   ED course: Vitals stable. Trop 13-12, BNP 62. Patient was going to AMA but then had another episode of chest pain, resolving shortly afterwards, then agreeing to stay for echo and stress test. Plan for tele monitoring and cardiac work up in CDU. in cdu pt had echo which was normal, stress test showed ischemia. pt was seen by cardiology, recommended admission for cath. Patient admitted to Dr. Harvey's service for cath procedure this weekend. Stable at time of admission.

## 2021-07-02 NOTE — H&P ADULT - PROBLEM SELECTOR PLAN 1
admitted to observation   NST done : pos for ischemia   -advised for admission for cardiac cath   c/w asa  echo done : EF wnl

## 2021-07-02 NOTE — CONSULT NOTE ADULT - ATTENDING COMMENTS
70 y/o man with HTN, HLD, reported ETOH abuse, CAD s/p reported cath at [facility] in 2018 with known reportedly non-obstructive LAD, CX and RCA lesions now with positive stress test.  --Patient is currently chest pain free, no sig events on telemetry.  --SPECT MPI revealed reversible defects in the inferior, inferoseptal, basal anterior, and basal anteroseptal walls suggestive of ischemia.  --Optimize medical therapy for CAD.  --Plan for coronary angiography.

## 2021-07-02 NOTE — H&P ADULT - NSHPLABSRESULTS_GEN_ALL_CORE
14.8   7.66  )-----------( 226      ( 01 Jul 2021 17:30 )             45.0     07-01    133<L>  |  99  |  38<H>  ----------------------------<  96  4.6   |  19<L>  |  1.36<H>    Ca    9.4      01 Jul 2021 17:30    TPro  7.2  /  Alb  4.5  /  TBili  0.2  /  DBili  x   /  AST  14  /  ALT  15  /  AlkPhos  86  07-01

## 2021-07-02 NOTE — H&P ADULT - NSHPADDITIONALINFOADULT_GEN_ALL_CORE
advance care planning : d/w patinet regarding advance directive , discuss regarding intubation , chest compression, CPR if the need arise. agree with everything and remain full code for now . time spend 15 min

## 2021-07-02 NOTE — ED CDU PROVIDER SUBSEQUENT DAY NOTE - HISTORY
No interval changes since initial CDU provider note. Pt without complaint. NAD VSS. no events on tele. Plan for stress test and echo in the setting of chest pain. - JOSE Fisher

## 2021-07-02 NOTE — H&P ADULT - NSHPPHYSICALEXAM_GEN_ALL_CORE
pt. seen and examined, NAD    Vital Signs Last 24 Hrs  T(C): 36.4 (02 Jul 2021 21:23), Max: 36.8 (02 Jul 2021 08:20)  T(F): 97.5 (02 Jul 2021 21:23), Max: 98.2 (02 Jul 2021 08:20)  HR: 65 (02 Jul 2021 21:23) (65 - 77)  BP: 128/82 (02 Jul 2021 21:23) (96/60 - 148/80)  BP(mean): 88 (02 Jul 2021 05:00) (88 - 88)  RR: 18 (02 Jul 2021 21:23) (16 - 22)  SpO2: 97% (02 Jul 2021 21:23) (95% - 99%)    heent: nc/at , no pallor   neck: supple, no JVD  Lungs : B/L clear, no w/r/r  heart: s1s2 nml  abd : soft, NABS, NT/Nd  ext: no e/c/c, pulses 2+  neuro: aaox3 , no focal deficit

## 2021-07-02 NOTE — H&P ADULT - NSICDXPASTMEDICALHX_GEN_ALL_CORE_FT
PAST MEDICAL HISTORY:  COPD (chronic obstructive pulmonary disease)     ETOH abuse     Hypertension

## 2021-07-02 NOTE — H&P ADULT - NSICDXFAMILYHX_GEN_ALL_CORE_FT
FAMILY HISTORY:  Mother  Still living? Unknown  Family history of CHF (congestive heart failure), Age at diagnosis: Age Unknown

## 2021-07-02 NOTE — ED CDU PROVIDER DISPOSITION NOTE - ATTENDING CONTRIBUTION TO CARE
I performed a history and physical exam of the patient and discussed their management with the Advanced Care Practitioner. I reviewed the ACP's note and agree with the documented findings and plan of care.   Patient had chest pain. echo was normal. stress test showed ischemia. admitted for  cath.

## 2021-07-02 NOTE — ED ADULT NURSE REASSESSMENT NOTE - NS ED NURSE REASSESS COMMENT FT1
Received pt from TRIS[facility] Back , received pt alert and responsive, oriented x4, denies any respiratory distress, SOB, or difficulty breathing. Pt transferred to CDU for chest pain, pt admitted to telemetry pending bed placement. On telemetry SR in monitor hr: 60's.  IV in place, patent and free of signs of infiltration,  pt denies chest pain or palpitations, V/S stable, pt afebrile,  Pt educated on unit and unit rules, instructed patient to notify RN of any needed assistance, Pt verbalizes understanding, Call bell placed within reach. Safety maintained. Will continue to monitor. Daughter at bedside.

## 2021-07-02 NOTE — ED ADULT NURSE REASSESSMENT NOTE - NS ED NURSE REASSESS COMMENT FT1
07.00 Am Received the Pt from  TRISHA Shafer Pt is Observed for Chest pain for Echo & nuclear stress test  pt speaks polish  & English  Received the Pt A&OX 4 obeys commands Vicky N/V/D fever chills cp SOB   Comfort care & safety measures continued  IV site looks clean & dry no signs of infiltration noted pt denies  pain IV site .  Pt is advised to call for help  call bell with in the reach pt verbalized the understanding .   pending CDU  MD hartmann . GCS 15/15 A&OX 4 PERRLA  size 3 Strong upper & lower extremities steady gait   No facial droop  No Hand Leg drop denies numbness tingling Continue to monitor

## 2021-07-02 NOTE — CONSULT NOTE ADULT - ASSESSMENT
71M w/ HTN, HLD, ETOH abuse, CAD s/p cath at [facility] in 2018 (Daughter, Iva used to be a cath PA there; has known LAD, CX and RCA lesions, but 20-30%, did not get intervened on) and COPD who presented with left sided non radiating chest pain for 4 days that only occurs with exertion. He was ruled out for ACS in the CDU and underwent stress testing, which showed mild to moderate defects in inferior and inferoseptal, basal anterior, basal anteroseptal walls that are reversible consistent with ischemia. Patient's daughter showed me results of his old stress test prior to his cath in 2018. It showed mostly basal and inf-lateral defects concerning for RCA territory. Stress testing here appears to show ischemia in different territories. Currently, he is asymptomatic. Echo today showed normal LV function without wall motion abnormalities. He likely has stable angina, does not appear to have ACS and is HDS.    Recs:  - stress and echo reviewed  - con't ASA  - switch statin to atorvastatin 40mg  - start lopressor 12.5mg BID with hold parameters of SBP <90 and HR <60  - consented for cath  - call cardiology if pain worsens or is present at rest, at which time he should be started on heparin bolus/drip, loaded with ASA and loaded with Plavix    Keven Paredes MD  Cardiology Fellow PGY-4  [facility]    Notes are not final until signed by attending  For all consults and questions:  [email or web address]   Login: [email or web address]  71M w/ HTN, HLD, ETOH abuse, CAD s/p cath at [facility] in 2018 (Daughter, Iva used to be a cath PA there; has known LAD, CX and RCA lesions, but 20-30%, did not get intervened on) and COPD who presented with left sided non radiating chest pain for 4 days that only occurs with exertion. He was ruled out for ACS in the CDU and underwent stress testing, which showed mild to moderate defects in inferior and inferoseptal, basal anterior, basal anteroseptal walls that are reversible consistent with ischemia. Patient's daughter showed me results of his old stress test prior to his cath in 2018. It showed mostly basal and inf-lateral defects concerning for RCA territory. Stress testing here appears to show ischemia in different territories. Currently, he is asymptomatic. Echo today showed normal LV function without wall motion abnormalities. He likely has stable angina, does not appear to have ACS and is HDS.    Recs:  - stress and echo reviewed  - con't ASA  - switch statin to atorvastatin 40mg  - start lopressor 12.5mg BID with hold parameters of SBP <90 and HR <60  - consented for cath  - call cardiology if pain worsens or is present at rest, at which time he should be started on heparin bolus/drip, loaded with ASA and loaded with Plavix    Keven Paredes MD  Cardiology Fellow PGY-5  [facility]    Notes are not final until signed by attending  For all consults and questions:  [email or web address]   Login: [email or web address]

## 2021-07-03 DIAGNOSIS — J44.9 CHRONIC OBSTRUCTIVE PULMONARY DISEASE, UNSPECIFIED: ICD-10-CM

## 2021-07-03 DIAGNOSIS — R07.9 CHEST PAIN, UNSPECIFIED: ICD-10-CM

## 2021-07-03 DIAGNOSIS — I10 ESSENTIAL (PRIMARY) HYPERTENSION: ICD-10-CM

## 2021-07-03 LAB
ANION GAP SERPL CALC-SCNC: 12 MMOL/L — SIGNIFICANT CHANGE UP (ref 5–17)
ANION GAP SERPL CALC-SCNC: 13 MMOL/L — SIGNIFICANT CHANGE UP (ref 5–17)
BUN SERPL-MCNC: 29 MG/DL — HIGH (ref 7–23)
BUN SERPL-MCNC: 29 MG/DL — HIGH (ref 7–23)
CALCIUM SERPL-MCNC: 9.5 MG/DL — SIGNIFICANT CHANGE UP (ref 8.4–10.5)
CALCIUM SERPL-MCNC: 9.7 MG/DL — SIGNIFICANT CHANGE UP (ref 8.4–10.5)
CHLORIDE SERPL-SCNC: 100 MMOL/L — SIGNIFICANT CHANGE UP (ref 96–108)
CHLORIDE SERPL-SCNC: 103 MMOL/L — SIGNIFICANT CHANGE UP (ref 96–108)
CK MB BLD-MCNC: 3.3 % — SIGNIFICANT CHANGE UP (ref 0–3.5)
CK MB CFR SERPL CALC: 1.6 NG/ML — SIGNIFICANT CHANGE UP (ref 0–6.7)
CK MB CFR SERPL CALC: 1.7 NG/ML — SIGNIFICANT CHANGE UP (ref 0–6.7)
CK MB CFR SERPL CALC: 1.8 NG/ML — SIGNIFICANT CHANGE UP (ref 0–6.7)
CK SERPL-CCNC: 49 U/L — SIGNIFICANT CHANGE UP (ref 30–200)
CK SERPL-CCNC: 53 U/L — SIGNIFICANT CHANGE UP (ref 30–200)
CK SERPL-CCNC: 58 U/L — SIGNIFICANT CHANGE UP (ref 30–200)
CO2 SERPL-SCNC: 22 MMOL/L — SIGNIFICANT CHANGE UP (ref 22–31)
CO2 SERPL-SCNC: 23 MMOL/L — SIGNIFICANT CHANGE UP (ref 22–31)
COVID-19 SPIKE DOMAIN AB INTERP: POSITIVE
COVID-19 SPIKE DOMAIN ANTIBODY RESULT: >250 U/ML — HIGH
CREAT SERPL-MCNC: 1.09 MG/DL — SIGNIFICANT CHANGE UP (ref 0.5–1.3)
CREAT SERPL-MCNC: 1.13 MG/DL — SIGNIFICANT CHANGE UP (ref 0.5–1.3)
GLUCOSE SERPL-MCNC: 101 MG/DL — HIGH (ref 70–99)
GLUCOSE SERPL-MCNC: 113 MG/DL — HIGH (ref 70–99)
HCT VFR BLD CALC: 46.9 % — SIGNIFICANT CHANGE UP (ref 39–50)
HCV AB S/CO SERPL IA: 0.15 S/CO — SIGNIFICANT CHANGE UP (ref 0–0.99)
HCV AB SERPL-IMP: SIGNIFICANT CHANGE UP
HGB BLD-MCNC: 15.4 G/DL — SIGNIFICANT CHANGE UP (ref 13–17)
MCHC RBC-ENTMCNC: 29.8 PG — SIGNIFICANT CHANGE UP (ref 27–34)
MCHC RBC-ENTMCNC: 32.8 GM/DL — SIGNIFICANT CHANGE UP (ref 32–36)
MCV RBC AUTO: 90.9 FL — SIGNIFICANT CHANGE UP (ref 80–100)
NRBC # BLD: 0 /100 WBCS — SIGNIFICANT CHANGE UP (ref 0–0)
PLATELET # BLD AUTO: 214 K/UL — SIGNIFICANT CHANGE UP (ref 150–400)
POTASSIUM SERPL-MCNC: 4.9 MMOL/L — SIGNIFICANT CHANGE UP (ref 3.5–5.3)
POTASSIUM SERPL-MCNC: 5.5 MMOL/L — HIGH (ref 3.5–5.3)
POTASSIUM SERPL-SCNC: 4.9 MMOL/L — SIGNIFICANT CHANGE UP (ref 3.5–5.3)
POTASSIUM SERPL-SCNC: 5.5 MMOL/L — HIGH (ref 3.5–5.3)
RBC # BLD: 5.16 M/UL — SIGNIFICANT CHANGE UP (ref 4.2–5.8)
RBC # FLD: 11.9 % — SIGNIFICANT CHANGE UP (ref 10.3–14.5)
SARS-COV-2 IGG+IGM SERPL QL IA: >250 U/ML — HIGH
SARS-COV-2 IGG+IGM SERPL QL IA: POSITIVE
SODIUM SERPL-SCNC: 136 MMOL/L — SIGNIFICANT CHANGE UP (ref 135–145)
SODIUM SERPL-SCNC: 137 MMOL/L — SIGNIFICANT CHANGE UP (ref 135–145)
TROPONIN T, HIGH SENSITIVITY RESULT: 10 NG/L — SIGNIFICANT CHANGE UP (ref 0–51)
TROPONIN T, HIGH SENSITIVITY RESULT: 12 NG/L — SIGNIFICANT CHANGE UP (ref 0–51)
TROPONIN T, HIGH SENSITIVITY RESULT: 14 NG/L — SIGNIFICANT CHANGE UP (ref 0–51)
WBC # BLD: 5.38 K/UL — SIGNIFICANT CHANGE UP (ref 3.8–10.5)
WBC # FLD AUTO: 5.38 K/UL — SIGNIFICANT CHANGE UP (ref 3.8–10.5)

## 2021-07-03 PROCEDURE — 93010 ELECTROCARDIOGRAM REPORT: CPT | Mod: 76

## 2021-07-03 PROCEDURE — 99221 1ST HOSP IP/OBS SF/LOW 40: CPT | Mod: GC

## 2021-07-03 PROCEDURE — 71045 X-RAY EXAM CHEST 1 VIEW: CPT | Mod: 26

## 2021-07-03 RX ORDER — ATORVASTATIN CALCIUM 80 MG/1
40 TABLET, FILM COATED ORAL AT BEDTIME
Refills: 0 | Status: DISCONTINUED | OUTPATIENT
Start: 2021-07-03 | End: 2021-07-04

## 2021-07-03 RX ORDER — ACETAMINOPHEN 500 MG
650 TABLET ORAL ONCE
Refills: 0 | Status: COMPLETED | OUTPATIENT
Start: 2021-07-03 | End: 2021-07-03

## 2021-07-03 RX ORDER — NITROGLYCERIN 6.5 MG
0.4 CAPSULE, EXTENDED RELEASE ORAL ONCE
Refills: 0 | Status: COMPLETED | OUTPATIENT
Start: 2021-07-03 | End: 2021-07-03

## 2021-07-03 RX ORDER — METOPROLOL TARTRATE 50 MG
12.5 TABLET ORAL
Refills: 0 | Status: DISCONTINUED | OUTPATIENT
Start: 2021-07-03 | End: 2021-07-04

## 2021-07-03 RX ADMIN — Medication 650 MILLIGRAM(S): at 19:00

## 2021-07-03 RX ADMIN — Medication 650 MILLIGRAM(S): at 18:01

## 2021-07-03 RX ADMIN — Medication 325 MILLIGRAM(S): at 11:37

## 2021-07-03 RX ADMIN — Medication 0.4 MILLIGRAM(S): at 17:50

## 2021-07-03 RX ADMIN — Medication 12.5 MILLIGRAM(S): at 17:51

## 2021-07-03 RX ADMIN — LISINOPRIL 12.5 MILLIGRAM(S): 2.5 TABLET ORAL at 22:38

## 2021-07-03 RX ADMIN — ATORVASTATIN CALCIUM 40 MILLIGRAM(S): 80 TABLET, FILM COATED ORAL at 22:38

## 2021-07-03 NOTE — PROVIDER CONTACT NOTE (OTHER) - SITUATION
Pt complains of chest pain 8/10, weakness
Pt complains of intermittent chest pain when he takes a deep breath, rating 5-6/10

## 2021-07-03 NOTE — PROGRESS NOTE ADULT - PROBLEM SELECTOR PLAN 1
admitted to observation   NST done : pos for ischemia   -scheduled for  cardiac cath   c/w asa  echo done : EF wnl

## 2021-07-03 NOTE — PROVIDER CONTACT NOTE (OTHER) - ASSESSMENT
Pt complains of intermittent chest pain when he takes a deep breath, rating 5-6/10. pt offers conflicting statements in regards to if he has pain now but from conversation with interp. appears he is have some   pain around his heart" when taking a deep breath.   EKG completed as orderd
Pt complains of chest pain 8/10, weakness  /79 HR 66 RR 18 Sp02 96  daughter at bedside interpreting

## 2021-07-03 NOTE — PROVIDER CONTACT NOTE (OTHER) - ACTION/TREATMENT ORDERED:
PA aware, continue to monitor, EKG, STAT labs, nitro x1 sublingual, and lopressor as ordered
PA aware, at bedside, continue to monitor, EKG completed stat labs added on

## 2021-07-03 NOTE — CHART NOTE - NSCHARTNOTEFT_GEN_A_CORE
PA Medicine Event Note    K 5.5 this AM. Patient also complaining of persistent L sided chest pain.   used at bedside- Pt stating he has constant pain "around heart." Stating difficult to explain.  Patient then called daughter at bedside to help translate as well- Per daughterMontse  patient has chronic L sided CP that now worsens when he ambulates which is why he came to hospital.  Patient denying any SOB, dizziness, palpitations, N/V, HA,   VSS    PHYSICAL EXAM:  GENERAL: Laying down, in no acute distress  PSYCH: AAOx3  HEAD:  Atraumatic, Normocephalic  EYES: EOMI, PERRLA, conjunctiva and sclera clear  NECK: Supple, No JVD  CHEST/LUNG: Breath sounds clear to auscultation bilaterally.  No wheezes, rales, rhonchi or crackles  HEART: +S1/S2.  Regular rate and rhythm.  No murmurs, rubs or gallops  ABDOMEN: Bowel sounds present in all 4 quadrants.  Soft, Nontender, Nondistended  EXTREMITIES: No edema or erythema noted in bilateral lower extremities  NEUROLOGY: Cranial nerves 2-12 grossly intact  SKIN: No rashes or lesions      EKG done as CE's. EKG w/o ischemic changes. Trop 14, rechecked 12.    Discussed with cardiology attending Dr. Argueta. Confirmed to continue current medications.  Cardiology to f/u any additional medications.  Plan for cath tomorrow.  Will cont to monitor patient and endorse to night team.    Janae Mccartney PA-C  Dept of Medicine  #[id number] PA Medicine Event Note    K 5.5 this AM. Patient also complaining of persistent L sided chest pain.   used at bedside- Pt stating he has constant pain "around heart." Stating difficult to explain.  Patient then called daughter at bedside to help translate as well- Per daughterIva  patient has chronic L sided CP that now worsens when he ambulates which is why he came to hospital.  Patient denying any SOB, dizziness, palpitations, N/V, HA,   VSS    PHYSICAL EXAM:  GENERAL: Laying down, in no acute distress  PSYCH: AAOx3  HEAD:  Atraumatic, Normocephalic  EYES: EOMI, PERRLA, conjunctiva and sclera clear  NECK: Supple, No JVD  CHEST/LUNG: Breath sounds clear to auscultation bilaterally.  No wheezes, rales, rhonchi or crackles  HEART: +S1/S2.  Regular rate and rhythm.  No murmurs, rubs or gallops  ABDOMEN: Bowel sounds present in all 4 quadrants.  Soft, Nontender, Nondistended  EXTREMITIES: No edema or erythema noted in bilateral lower extremities  NEUROLOGY: Cranial nerves 2-12 grossly intact  SKIN: No rashes or lesions      EKG done as CE's. EKG w/o ischemic changes. Trop 14, rechecked 12.    Discussed with cardiology attending Dr. Argueta. Confirmed to continue current medications.  Cardiology to f/u any additional medications.  Plan for cath tomorrow.  Will cont to monitor patient and endorse to night team.    Janae Mccartney PA-C  Dept of Medicine  #[phone]

## 2021-07-04 ENCOUNTER — TRANSCRIPTION ENCOUNTER (OUTPATIENT)
Age: 71
End: 2021-07-04

## 2021-07-04 VITALS — HEART RATE: 64 BPM | DIASTOLIC BLOOD PRESSURE: 72 MMHG | SYSTOLIC BLOOD PRESSURE: 106 MMHG

## 2021-07-04 LAB
ANION GAP SERPL CALC-SCNC: 9 MMOL/L — SIGNIFICANT CHANGE UP (ref 5–17)
BUN SERPL-MCNC: 28 MG/DL — HIGH (ref 7–23)
CALCIUM SERPL-MCNC: 9.7 MG/DL — SIGNIFICANT CHANGE UP (ref 8.4–10.5)
CHLORIDE SERPL-SCNC: 105 MMOL/L — SIGNIFICANT CHANGE UP (ref 96–108)
CO2 SERPL-SCNC: 24 MMOL/L — SIGNIFICANT CHANGE UP (ref 22–31)
CREAT SERPL-MCNC: 1.17 MG/DL — SIGNIFICANT CHANGE UP (ref 0.5–1.3)
GLUCOSE SERPL-MCNC: 109 MG/DL — HIGH (ref 70–99)
HCT VFR BLD CALC: 47.2 % — SIGNIFICANT CHANGE UP (ref 39–50)
HGB BLD-MCNC: 15.3 G/DL — SIGNIFICANT CHANGE UP (ref 13–17)
MCHC RBC-ENTMCNC: 29.6 PG — SIGNIFICANT CHANGE UP (ref 27–34)
MCHC RBC-ENTMCNC: 32.4 GM/DL — SIGNIFICANT CHANGE UP (ref 32–36)
MCV RBC AUTO: 91.3 FL — SIGNIFICANT CHANGE UP (ref 80–100)
NRBC # BLD: 0 /100 WBCS — SIGNIFICANT CHANGE UP (ref 0–0)
PLATELET # BLD AUTO: 197 K/UL — SIGNIFICANT CHANGE UP (ref 150–400)
POTASSIUM SERPL-MCNC: 5.4 MMOL/L — HIGH (ref 3.5–5.3)
POTASSIUM SERPL-SCNC: 5.4 MMOL/L — HIGH (ref 3.5–5.3)
RBC # BLD: 5.17 M/UL — SIGNIFICANT CHANGE UP (ref 4.2–5.8)
RBC # FLD: 11.9 % — SIGNIFICANT CHANGE UP (ref 10.3–14.5)
SODIUM SERPL-SCNC: 138 MMOL/L — SIGNIFICANT CHANGE UP (ref 135–145)
WBC # BLD: 5.99 K/UL — SIGNIFICANT CHANGE UP (ref 3.8–10.5)
WBC # FLD AUTO: 5.99 K/UL — SIGNIFICANT CHANGE UP (ref 3.8–10.5)

## 2021-07-04 PROCEDURE — C1894: CPT

## 2021-07-04 PROCEDURE — 71045 X-RAY EXAM CHEST 1 VIEW: CPT

## 2021-07-04 PROCEDURE — 80061 LIPID PANEL: CPT

## 2021-07-04 PROCEDURE — 86769 SARS-COV-2 COVID-19 ANTIBODY: CPT

## 2021-07-04 PROCEDURE — 99233 SBSQ HOSP IP/OBS HIGH 50: CPT | Mod: GC

## 2021-07-04 PROCEDURE — 86803 HEPATITIS C AB TEST: CPT

## 2021-07-04 PROCEDURE — 36000 PLACE NEEDLE IN VEIN: CPT

## 2021-07-04 PROCEDURE — 83880 ASSAY OF NATRIURETIC PEPTIDE: CPT

## 2021-07-04 PROCEDURE — 99152 MOD SED SAME PHYS/QHP 5/>YRS: CPT | Mod: 59

## 2021-07-04 PROCEDURE — 99285 EMERGENCY DEPT VISIT HI MDM: CPT | Mod: 25

## 2021-07-04 PROCEDURE — 85025 COMPLETE CBC W/AUTO DIFF WBC: CPT

## 2021-07-04 PROCEDURE — 93454 CORONARY ARTERY ANGIO S&I: CPT | Mod: 26,59

## 2021-07-04 PROCEDURE — 80053 COMPREHEN METABOLIC PANEL: CPT

## 2021-07-04 PROCEDURE — 93306 TTE W/DOPPLER COMPLETE: CPT

## 2021-07-04 PROCEDURE — 82553 CREATINE MB FRACTION: CPT

## 2021-07-04 PROCEDURE — 93017 CV STRESS TEST TRACING ONLY: CPT

## 2021-07-04 PROCEDURE — U0003: CPT

## 2021-07-04 PROCEDURE — 93454 CORONARY ARTERY ANGIO S&I: CPT

## 2021-07-04 PROCEDURE — 83036 HEMOGLOBIN GLYCOSYLATED A1C: CPT

## 2021-07-04 PROCEDURE — 80048 BASIC METABOLIC PNL TOTAL CA: CPT

## 2021-07-04 PROCEDURE — 82550 ASSAY OF CK (CPK): CPT

## 2021-07-04 PROCEDURE — G0378: CPT

## 2021-07-04 PROCEDURE — 84484 ASSAY OF TROPONIN QUANT: CPT

## 2021-07-04 PROCEDURE — 78452 HT MUSCLE IMAGE SPECT MULT: CPT

## 2021-07-04 PROCEDURE — 99152 MOD SED SAME PHYS/QHP 5/>YRS: CPT

## 2021-07-04 PROCEDURE — C1887: CPT

## 2021-07-04 PROCEDURE — 85027 COMPLETE CBC AUTOMATED: CPT

## 2021-07-04 PROCEDURE — 93005 ELECTROCARDIOGRAM TRACING: CPT

## 2021-07-04 PROCEDURE — C1769: CPT

## 2021-07-04 PROCEDURE — 71046 X-RAY EXAM CHEST 2 VIEWS: CPT

## 2021-07-04 PROCEDURE — A9500: CPT

## 2021-07-04 RX ORDER — METOPROLOL TARTRATE 50 MG
0.5 TABLET ORAL
Qty: 30 | Refills: 0
Start: 2021-07-04 | End: 2021-08-02

## 2021-07-04 RX ADMIN — Medication 12.5 MILLIGRAM(S): at 06:35

## 2021-07-04 RX ADMIN — Medication 325 MILLIGRAM(S): at 11:36

## 2021-07-04 NOTE — PROGRESS NOTE ADULT - ASSESSMENT
72yo man PMHx HTN, HLD, ETOH abuse, CAD s/p cath at [facility] in 2018 (daughter, Iva used to be a cath PA there; has known LAD, CX and RCA lesions, but 20-30%, did not get intervened on) and COPD who presented with left sided non radiating chest pain for 4 days that only occurs with exertion.     #Chest Pain  DDx: Pleurisy from COPD?  -Stress and echo reviewed; preserved EF but with positive stress  -C with clean coronary arteries  -Continue atorvastatin 40mg  -Continue lopressor 12.5mg BID with hold parameters of SBP <90 and HR <60  -Continue lisinopril 12.5mg PO daily    Case discussed with Dr. Argueta.    Alma Medellin MD PGY-6  Cardiology Fellow  All Cardiology service information can be found 24/7 on [email or web address], password: [email or web address]  Note is preliminary until signed by the attending.     70yo man PMHx HTN, HLD, ETOH abuse, CAD s/p cath at [facility] in 2018 (daughter, Iva used to be a cath PA there; has known LAD, CX and RCA lesions, but 20-30%, did not get intervened on) and COPD who presented with left sided non radiating chest pain for 4 days that only occurs with exertion.     #Chest Pain  DDx: Pleurisy from COPD?  -Stress and echo reviewed; preserved EF but with positive stress  -LHC with luminal irregularities per report   -Continue atorvastatin 40mg  -Continue lopressor 12.5mg BID with hold parameters of SBP <90 and HR <60  -Continue lisinopril 12.5mg PO daily    Alma Medellin MD PGY-6  Cardiology Fellow  All Cardiology service information can be found 24/7 on [email or web address], password: [email or web address]  Note is preliminary until signed by the attending.

## 2021-07-04 NOTE — DISCHARGE NOTE PROVIDER - CARE PROVIDER_API CALL
[facility],   Phone: (   )    -  Fax: (   )    -  Follow Up Time: 1 week    LUIS ARMANDO Parikh  Phone: (   )    -  Fax: (   )    -  Follow Up Time: 1 week

## 2021-07-04 NOTE — PROGRESS NOTE ADULT - ATTENDING COMMENTS
72 y/o man with HTN, HLD, reported ETOH abuse, COPD, admitted with CP and reportedly abnormal stress test.    --Per Interventionalist, luminal irregularities on cath; no obstructive disease.    --No further cardiac work-up at this time.  --Continue medical therapy; recommend close outpatient follow-up with Cardiology.  --Medicine follow-up.

## 2021-07-04 NOTE — DISCHARGE NOTE PROVIDER - PROVIDER TOKENS
FREE:[LAST:[[facility]],PHONE:[(   )    -],FAX:[(   )    -],FOLLOWUP:[1 week]],FREE:[LAST:[Kati],PHONE:[(   )    -],FAX:[(   )    -],ADDRESS:[PMD],FOLLOWUP:[1 week]]

## 2021-07-04 NOTE — DISCHARGE NOTE PROVIDER - NSDCMRMEDTOKEN_GEN_ALL_CORE_FT
alfuzosin 10 mg oral tablet, extended release: 1 tab(s) orally once a day  Donte Aspirin 325 mg oral tablet: orally once a day  cilostazol 50 mg oral tablet: 1 tab(s) orally once a day (at bedtime)  lisinopril: 12.5 milligram(s) orally once a day (at bedtime)  pravastatin 40 mg oral tablet: 1 tab(s) orally once a day   alfuzosin 10 mg oral tablet, extended release: 1 tab(s) orally once a day  Donte Aspirin 325 mg oral tablet: orally once a day  cilostazol 50 mg oral tablet: 1 tab(s) orally once a day (at bedtime)  lisinopril: 12.5 milligram(s) orally once a day (at bedtime)  Metoprolol Tartrate 25 mg oral tablet: 0.5 tab(s) orally 2 times a day   pravastatin 40 mg oral tablet: 1 tab(s) orally once a day

## 2021-07-04 NOTE — DISCHARGE NOTE PROVIDER - NSDCCPCAREPLAN_GEN_ALL_CORE_FT
PRINCIPAL DISCHARGE DIAGNOSIS  Diagnosis: Atypical chest pain  Assessment and Plan of Treatment: Presented with chest pain  ECG showed Normal Sinus Rhythm.  Stress test showed mild to moderate defects in inferior and inferoseptal, basal anterior, basal anteroseptal walls that are reversible consistent with ischemia.   Echo showed normal LV function without wall motion abnormalities. s/p diagnostic LHC on 7/4/21 with luminal irregularities nonobstructive CAD.       PRINCIPAL DISCHARGE DIAGNOSIS  Diagnosis: Atypical chest pain  Assessment and Plan of Treatment: Presented with chest pain  ECG showed Normal Sinus Rhythm.  Stress test showed mild to moderate defects in inferior and inferoseptal, basal anterior, basal anteroseptal walls that are reversible consistent with ischemia.   Echo showed normal LV function without wall motion abnormalities. s/p diagnostic LHC on 7/4/21 with luminal irregularities nonobstructive CAD.  Outpatient follow-up with Cardiology in 1 week  HOME CARE INSTRUCTIONS  For the next few days, avoid physical activities that bring on chest pain. Continue physical activities as directed.  Do not smoke.  Avoid drinking alcohol.   Follow your caregiver's suggestions for further testing if your chest pain recurs  THIS IS AN EMERGENCY. Do not wait to see if the pain will go away. Get medical help at once. Call your local emergency services (____[phone]____). Do not drive yourself to the hospital.        SECONDARY DISCHARGE DIAGNOSES  Diagnosis: History of COPD  Assessment and Plan of Treatment: Follow up with PMD in 1week for further work    Diagnosis: CAD (coronary artery disease)  Assessment and Plan of Treatment: Coronary artery disease is a condition where the arteries the supply the heart muscle get clogges with fatty deposits & puts you at risk for a heart attack  Call your doctor if you have any new pain, pressure, or discomfort in the center of your chest, pain, tingling or discomfort in arms, back, neck, jaw, or stomach, shortness of breath, nausea, vomiting, burping or heartburn, sweating, cold and clammy skin, racing or abnormal heartbeat for more than 10 minutes or if they keep coming & going.  Call 911 and do not tr to get to hospital by care  You can help yourself with lefestyle changes (quitting smoking if you smoke), eat lots of fruits & vegetables & low fat dairy products, not a lot of meat & fatty foods, walk or some form of physical activity most days of the week, lose weight if you are overweight  Take your cardiac medication as prescribed to lower cholesterol, to lower blood pressure, aspirin to prevent blood clots, and diabetes control  Make sure to keep appointments with doctor for cardiac follow up care

## 2021-07-04 NOTE — DISCHARGE NOTE NURSING/CASE MANAGEMENT/SOCIAL WORK - PATIENT PORTAL LINK FT
You can access the FollowMyHealth Patient Portal offered by [facility] by registering at the following website: [email or web address]. By joining [employer]’s FollowMyHealth portal, you will also be able to view your health information using other applications (apps) compatible with our system.

## 2021-07-04 NOTE — DISCHARGE NOTE PROVIDER - HOSPITAL COURSE
71M w/ HTN, HLD, ETOH abuse, CAD s/p cath at [facility] in 2018 with known LAD, CX and RCA lesions, but 20-30%, did not get intervened on and COPD who presented with left sided non radiating chest pain for 4 days that only occurs with exertion. He has had chest pain at baseline due to his COPD, but this pain was different in nature in regards to intensity. ECG showed NSR. He underwent stress test today that showed mild to moderate defects in inferior and inferoseptal, basal anterior, basal anteroseptal walls that are reversible consistent with ischemia. Echo showed normal LV function without wall motion abnormalities. s/p diagnostic LHC on 7/4/21with luminal irregularities via RRA, nonobstructive CAD. 71M w/ HTN, HLD, ETOH abuse, CAD s/p cath at [facility] in 2018 with known LAD, CX and RCA lesions, but 20-30%, did not get intervened on and COPD who presented with left sided non radiating chest pain for 4 days that only occurs with exertion. He has had chest pain at baseline due to his COPD, but this pain was different in nature in regards to intensity. ECG showed NSR. He underwent stress test today that showed mild to moderate defects in inferior and inferoseptal, basal anterior, basal anteroseptal walls that are reversible consistent with ischemia. Echo showed normal LV function without wall motion abnormalities. s/p diagnostic LHC on 7/4/21 with luminal irregularities via RRA, nonobstructive CAD. Chest Pain likely sec to Pleurisy from COPD?  Cleared by cardiology and Dr. Harvey with close outpatient follow-up with Cardiology.

## 2021-07-04 NOTE — PROGRESS NOTE ADULT - SUBJECTIVE AND OBJECTIVE BOX
Daily In-House Cardiology Progress Note  -------------------------------------------------------    24-Hour Events/Subjective:      -No CP, SOB, HORNER, palpitations, dizziness.    ROS:   Constitutional: No Fatigue, weakness, fever, chills  HEENT: No sore throat, dryness, hoarseness, congestion  GI: No nausea, vomiting, diarrhea, poor PO tolerance, dyspepsia, dysphagia  Musculoskeletal: No myalgias, arthralgias, joint swelling, back pain  Extremities: No swelling, coldness  Skin: No rashes, lesions, pruritis   Neuro: No weakness, confusion, blurry vision  Psych: No anxiety, depression    Current Meds:  aspirin enteric coated 325 milliGRAM(s) Oral daily  atorvastatin 40 milliGRAM(s) Oral at bedtime  lisinopril 12.5 milliGRAM(s) Oral at bedtime  metoprolol tartrate 12.5 milliGRAM(s) Oral two times a day    Vitals:  T(F): 97.6 (07-04), Max: 98.1 (07-03)  HR: 64 (07-04) (50 - 80)  BP: 106/72 (07-04) (92/63 - 132/79)  RR: 18 (07-04)  SpO2: 99% (07-04)  I&O's Summary    Physical Exam:  GENERAL: No acute distress, well-developed  HEAD:  Atraumatic, Normocephalic  ENT: EOMI, PERRLA, conjunctiva and sclera clear, Neck supple, No JVD, moist mucosa  CHEST/LUNG: Clear to auscultation bilaterally; No wheeze, equal breath sounds bilaterally   BACK: No spinal tenderness  HEART: Regular rate and rhythm; No murmurs, rubs, or gallops  ABDOMEN: Soft, Nontender, Nondistended; Bowel sounds present  EXTREMITIES:  No clubbing, cyanosis, or edema  PSYCH: Nl behavior, nl affect  NEUROLOGY: AAOx3, non-focal, cranial nerves intact  SKIN: Normal color, No rashes or lesions                        15.3   5.99  )-----------( 197      ( 04 Jul 2021 06:43 )             47.2     07-04    138  |  105  |  28<H>  ----------------------------<  109<H>  5.4<H>   |  24  |  1.17    Ca    9.7      04 Jul 2021 06:43    CARDIAC MARKERS ( 03 Jul 2021 18:03 )  10 ng/L / x     / x     / 49 U/L / x     / 1.6 ng/mL  CARDIAC MARKERS ( 03 Jul 2021 12:48 )  12 ng/L / x     / x     / 53 U/L / x     / 1.8 ng/mL  CARDIAC MARKERS ( 03 Jul 2021 09:26 )  14 ng/L / x     / x     / 58 U/L / x     / 1.7 ng/mL  CARDIAC MARKERS ( 01 Jul 2021 19:30 )  12 ng/L / x     / x     / x     / x     / x      CARDIAC MARKERS ( 01 Jul 2021 17:30 )  13 ng/L / x     / x     / x     / x     / x        Serum Pro-Brain Natriuretic Peptide: 62 pg/mL (07-01 @ 17:30)      
Patient is a 71y old  Male who presents with a chief complaint of positive stress test / chest pain (02 Jul 2021 21:34)      INTERVAL HPI/OVERNIGHT EVENTS: denies any CP at present     T(C): 36.7 (07-03-21 @ 20:33), Max: 36.8 (07-03-21 @ 11:34)  HR: 60 (07-03-21 @ 20:33) (58 - 71)  BP: 124/81 (07-03-21 @ 20:33) (109/71 - 132/79)  RR: 18 (07-03-21 @ 20:33) (18 - 18)  SpO2: 96% (07-03-21 @ 20:33) (96% - 97%)  Wt(kg): --  I&O's Summary      PAST MEDICAL & SURGICAL HISTORY:  Hypertension    COPD (chronic obstructive pulmonary disease)    ETOH abuse    Finger amputee        SOCIAL HISTORY  Alcohol:  Tobacco:  Illicit substance use:    FAMILY HISTORY:    REVIEW OF SYSTEMS:  CONSTITUTIONAL: No fever, weight loss, or fatigue  EYES: No eye pain, visual disturbances, or discharge  ENMT:  No difficulty hearing, tinnitus, vertigo; No sinus or throat pain  NECK: No pain or stiffness  RESPIRATORY: No cough, wheezing, chills or hemoptysis; No shortness of breath  CARDIOVASCULAR: No chest pain, palpitations, dizziness, or leg swelling  GASTROINTESTINAL: No abdominal or epigastric pain. No nausea, vomiting, or hematemesis; No diarrhea or constipation. No melena or hematochezia.  GENITOURINARY: No dysuria, frequency, hematuria, or incontinence  NEUROLOGICAL: No headaches, memory loss, loss of strength, numbness, or tremors  SKIN: No itching, burning, rashes, or lesions   LYMPH NODES: No enlarged glands  ENDOCRINE: No heat or cold intolerance; No hair loss  MUSCULOSKELETAL: No joint pain or swelling; No muscle, back, or extremity pain  PSYCHIATRIC: No depression, anxiety, mood swings, or difficulty sleeping  HEME/LYMPH: No easy bruising, or bleeding gums  ALLERY AND IMMUNOLOGIC: No hives or eczema    RADIOLOGY & ADDITIONAL TESTS:    Imaging Personally Reviewed:  [ ] YES  [ ] NO    Consultant(s) Notes Reviewed:  [ ] YES  [ ] NO    PHYSICAL EXAM:  GENERAL: NAD, well-groomed, well-developed  HEAD:  Atraumatic, Normocephalic  EYES: EOMI, PERRLA, conjunctiva and sclera clear  ENMT: No tonsillar erythema, exudates, or enlargement; Moist mucous membranes, Good dentition, No lesions  NECK: Supple, No JVD, Normal thyroid  NERVOUS SYSTEM:  Alert & Oriented X3, Good concentration; Motor Strength 5/5 B/L upper and lower extremities; DTRs 2+ intact and symmetric  CHEST/LUNG: Clear to percussion bilaterally; No rales, rhonchi, wheezing, or rubs  HEART: Regular rate and rhythm; No murmurs, rubs, or gallops  ABDOMEN: Soft, Nontender, Nondistended; Bowel sounds present  EXTREMITIES:  2+ Peripheral Pulses, No clubbing, cyanosis, or edema  LYMPH: No lymphadenopathy noted  SKIN: No rashes or lesions    LABS:                        15.4   5.38  )-----------( 214      ( 03 Jul 2021 09:26 )             46.9     07-03    136  |  100  |  29<H>  ----------------------------<  101<H>  4.9   |  23  |  1.13    Ca    9.5      03 Jul 2021 12:48          CAPILLARY BLOOD GLUCOSE                MEDICATIONS  (STANDING):  aspirin enteric coated 325 milliGRAM(s) Oral daily  atorvastatin 40 milliGRAM(s) Oral at bedtime  lisinopril 12.5 milliGRAM(s) Oral at bedtime  metoprolol tartrate 12.5 milliGRAM(s) Oral two times a day    MEDICATIONS  (PRN):      Care Discussed with Consultants/Other Providers [ ] YES  [ ] NO

## 2021-07-04 NOTE — DISCHARGE NOTE PROVIDER - NSDCCPTREATMENT_GEN_ALL_CORE_FT
PRINCIPAL PROCEDURE  Procedure: Left heart cardiac cath  Findings and Treatment: No heavy lifting or pushing/pulling with procedure arm for 2 weeks. No driving for 2 days. You may shower 24 hours following the procedure but avoid baths/swimming for 1 week. Check your wrist site for bleeding and/or swelling daily following procedure and call your doctor immediately if it occurs or if you experience increased pain at the site, cold or discoloration of arm. Follow up with your cardiologist in 1-2 weeks. You may call [facility] if you have any questions/concerns regarding your procedure [phone].

## 2022-05-15 NOTE — PATIENT PROFILE ADULT - NSTRANSFEREYEGLASSESPAIRS_GEN_A_NUR
Pt w cough that is persistent since 5/9. Fever has subsided. Pt is awake, alert and appropriate. Easy work of breathing, lungs clear. Coloring appropriate. GUAJARDO. No PMH. NKDA. VUTD. Coughing sporadically during triage. 1 pair

## 2022-07-14 NOTE — PROGRESS NOTE ADULT - PROBLEM SELECTOR PLAN 3
July 14, 2022       Lisandra Doherty, [address]  Via Fax: [phone]      Patient: Brandon Goodwin   YOB: 1952   Date of Visit: 7/14/2022       Dear Dr. Mary Mane: Thank you for referring Selina Hardin to me for evaluation. Below are my notes for this visit with him. If you have questions, please do not hesitate to call me. I look forward to following your patient along with you. Sincerely,        Brad Gutiérrez MD        CC: No Recipients  Pierkaleigh Gupta MD  7/14/2022  3:29 PM  Signed  Chief Complaint/Reason for Visit:   Chief Complaint   Patient presents with   â¢ Office Visit     8 month follow up   â¢ Referral     PCP-Dr. Mary Mane     HISTORY OF PRESENT ILLNESS: Brandon Goodwin is a 68year old male with a past medical history of coronary artery disease status post drug-eluting stent to the RCA in December 2009 with moderate disease in the left anterior descending at that time maintained on guideline directed medical therapy. Patient has also hypertension, dyslipidemia on medical therapy. He is former smoker. Patient was hospitalized in May 2021 with Covid infection. He has recovered w no swqualae    He presents to the office today for follow-up     REVIEW OF SYSTEMS:  Review of Systems   Constitutional: Negative for chills and fever. HENT: Negative for nosebleeds. Eyes: Negative for blurred vision. Cardiovascular: Negative for chest pain, dyspnea on exertion, leg swelling, orthopnea, palpitations and paroxysmal nocturnal dyspnea. Respiratory: Negative for cough, shortness of breath and sleep disturbances due to breathing. Hematologic/Lymphatic: Does not bruise/bleed easily. Skin: Negative for color change and poor wound healing. Musculoskeletal: Negative for myalgias. Gastrointestinal: Negative for hematemesis and melena. Genitourinary: Negative for dysuria and hematuria.    Neurological: Negative for dizziness and light-headedness. Psychiatric/Behavioral: Negative for altered mental status. Allergic/Immunologic: Negative for environmental allergies. PAST MEDICAL HISTORY:   Past Medical History:   Diagnosis Date   â¢ Atherosclerotic heart disease of native coronary artery without angina pectoris    â¢ Dyslipidemia    â¢ Hypertension      PAST SURGICAL HISTORY:   Past Surgical History:   Procedure Laterality Date   â¢ Coronary angioplasty with stent placement     â¢ Ptca       FAMILY HISTORY:   Family History   Problem Relation Age of Onset   â¢ Patient is unaware of any medical problems Mother    â¢ Patient is unaware of any medical problems Father      SOCIAL HISTORY:   Social History     Tobacco Use   â¢ Smoking status: Former Smoker     Packs/day: 0.00     Quit date: 6/10/2019     Years since quitting: 3.0   â¢ Smokeless tobacco: Never Used   Substance Use Topics   â¢ Alcohol use: No   â¢ Drug use: Yes     Types: Cannabinols     Comment: medical       Drug Use:    Yes                Special: Cannabinols       Comment: medical    ALLERGIES:   ALLERGIES:   Allergen Reactions   â¢ Fish   (Food Or Med) Other (See Comments)     Unknown   â¢ No Name Available Other (See Comments)     No Known Drug Allergies   â¢ Strawberry   (Food Or Med) Other (See Comments)     Unknown     MEDICATIONS:   Current Outpatient Medications   Medication Sig Dispense Refill   â¢ metoPROLOL succinate (TOPROL-XL) 25 MG 24 hr tablet Take 1 tablet by mouth daily. 90 tablet 3   â¢ amLODIPine (NORVASC) 5 MG tablet Take 1 tablet by mouth once daily 90 tablet 0   â¢ atorvastatin (LIPITOR) 40 MG tablet TAKE 1 TABLET BY MOUTH AT BEDTIME 90 tablet 0   â¢ lisinopril (ZESTRIL) 20 MG tablet Take 1 tablet by mouth twice daily 180 tablet 0   â¢ aspirin 81 MG tablet Take 1 tablet by mouth daily. 30 tablet 11     No current facility-administered medications for this visit.      PHYSICAL  EXAMINATION  Visit Vitals  /62 (BP Location: LUE - Left upper extremity, Patient "Position: Sitting, Cuff Size: Regular)   Pulse (!) 50   Ht 5' 5"" (1.651 m)   Wt 83.5 kg (184 lb)   BMI 30.62 kg/mÂ²     Physical Exam  Vitals reviewed. Constitutional:       General: He is not in acute distress. Appearance: Normal appearance. He is well-developed. He is not diaphoretic. HENT:      Head: Normocephalic and atraumatic. Eyes:      Conjunctiva/sclera: Conjunctivae normal.   Neck:      Vascular: No carotid bruit or JVD. Cardiovascular:      Rate and Rhythm: Normal rate and regular rhythm. Pulses: Normal pulses. Heart sounds: S1 normal and S2 normal. No murmur heard. Pulmonary:      Effort: Pulmonary effort is normal. No respiratory distress. Breath sounds: Normal breath sounds. No wheezing or rales. Chest:      Chest wall: No tenderness. Abdominal:      General: There is no distension. Palpations: Abdomen is soft. Musculoskeletal:         General: Normal range of motion. Cervical back: Normal range of motion. Skin:     General: Skin is warm and dry. Neurological:      Mental Status: He is alert and oriented to person, place, and time. Psychiatric:         Mood and Affect: Mood normal.         Behavior: Behavior normal.       Diagnostic Testing:     Labs:    Recent Labs   Lab 05/24/22  1141   Cholesterol 99   HDL 38*   Triglycerides 124   CALCLDL 36   Non-HDL Cholesterol 61     Recent Labs   Lab 05/24/22  1141 11/09/21  1022   Sodium 140 141   Chloride 108 109*   BUN 22* 22*   BUN/ Creatinine Ratio 18 20   Potassium 4.8 5.0   Glucose 98 91   Creatinine 1.22* 1.12   Calcium 9.1 9.0   TSH  --  3.130     IMPRESSION/PLAN:    Essential hypertension  -On metoprolol succinate, lisinopril, amlodipine  -Continue low-sodium diet  -lower metoprolol 25 mg    Hypercholesterolemia  -Continue statin therapy.   LDL cholesterol goal less than 60 mg percent  -Labs ordered  Â   Atherosclerosis of native coronary artery of native heart without angina pectoris  -History of PCI to " the RCA in December 2009, 50% stenosis of the mid LAD  -Normal LV systolic function  -Patient has sinus bradycardia. Will reduce dose of metoprolol succinate from 50 mg to 25 mg daily  -No symptoms of angina      Work-up for occult blood in the stool and progress. If needed okay to stop aspirin for a GI work-up    7/14/2022    Return in about 1 year (around 7/14/2023). controlled   c/w home meds

## 2023-03-29 NOTE — ED PROVIDER NOTE - OBSERVING MD:
Quality 111:Pneumonia Vaccination Status For Older Adults: Pneumococcal vaccine (PPSV23) administered on or after patient’s 60th birthday and before the end of the measurement period Detail Level: Detailed Quality 226: Preventive Care And Screening: Tobacco Use: Screening And Cessation Intervention: Patient screened for tobacco use and is an ex/non-smoker Quality 130: Documentation Of Current Medications In The Medical Record: Current Medications Documented Quality 431: Preventive Care And Screening: Unhealthy Alcohol Use - Screening: Patient not identified as an unhealthy alcohol user when screened for unhealthy alcohol use using a systematic screening method Quality 110: Preventive Care And Screening: Influenza Immunization: Influenza Immunization Administered during Influenza season [place]

## 2023-07-21 NOTE — SBIRT NOTE ADULT - NSSBIRTALCACTION/INTER_GEN_A_CORE
Pre-treatment instructions:   Do not shave your legs for 24 hours before your appointment  Do not apply creams or lotions to your legs the night prior or morning of treatment  Bring your graded compression stockings to apply after treatment  Eat a light meal or snack 1½ to 2 hours before treatment    What to expect during treatment:  Small amount of pain/burning from needle sticks and injection  Treatment time 15-30 minutes  15-minute wait in clinic after initial session to ensure no allergic reaction    After your treatment:  Wear your compression stocking(s) continuously for 48 hours. You may remove tape and bandages and shower normally after 12-24 hours.  Continue to wear your stockings during the daytime hours for 2 weeks.  The longer the elastic stockings are tolerated, the lower the risk of trapped blood causing phlebitis.   You may take a warm shower but do not take hot baths/showers for one week and do not sit in a hot tub or spa for 2 weeks as excessive heat can cause vein dilation.  You will be able to maintain normal activities.  Walk 10-15 minutes at least three times daily, the more the better.  However, avoid prolonged standing.    Treated areas may be temporarily tender, slightly red and swollen. You may apply ice directly to the area for relief.  In the event of a scab, do not pick or remove, but you may apply a topical antibiotic (Neosporin).   Depending on the size of the veins treated, they may become hard, lumpy and tender to the touch - this may last several weeks to months. Warm compress, wearing your compression socks and ibuprofen may ease the discomfort.  Avoid direct sun exposure to the treated area without SPF greater than 30 in treated areas.  UV (ultraviolet) tends to increase the risk of hyperpigmentation of the area.  **The key is to be faithful wearing your compression stocking. This will optimize your treatment results.**   
Positive reinforcement

## 2024-03-11 ENCOUNTER — EMERGENCY (EMERGENCY)
Facility: HOSPITAL | Age: 74
LOS: 1 days | Discharge: HOME | End: 2024-03-11
Attending: EMERGENCY MEDICINE
Payer: COMMERCIAL

## 2024-03-11 VITALS
DIASTOLIC BLOOD PRESSURE: 81 MMHG | SYSTOLIC BLOOD PRESSURE: 146 MMHG | HEART RATE: 69 BPM | WEIGHT: 186.07 LBS | TEMPERATURE: 98 F | HEIGHT: 70.87 IN | OXYGEN SATURATION: 98 % | RESPIRATION RATE: 16 BRPM

## 2024-03-11 VITALS
RESPIRATION RATE: 18 BRPM | OXYGEN SATURATION: 100 % | SYSTOLIC BLOOD PRESSURE: 123 MMHG | TEMPERATURE: 98 F | DIASTOLIC BLOOD PRESSURE: 78 MMHG | HEART RATE: 66 BPM

## 2024-03-11 DIAGNOSIS — Z89.029 ACQUIRED ABSENCE OF UNSPECIFIED FINGER(S): Chronic | ICD-10-CM

## 2024-03-11 PROBLEM — F10.10 ALCOHOL ABUSE, UNCOMPLICATED: Chronic | Status: ACTIVE | Noted: 2021-07-01

## 2024-03-11 PROBLEM — J44.9 CHRONIC OBSTRUCTIVE PULMONARY DISEASE, UNSPECIFIED: Chronic | Status: ACTIVE | Noted: 2021-07-01

## 2024-03-11 PROBLEM — I10 ESSENTIAL (PRIMARY) HYPERTENSION: Chronic | Status: ACTIVE | Noted: 2021-07-01

## 2024-03-11 LAB
ALBUMIN SERPL ELPH-MCNC: 4.1 G/DL — SIGNIFICANT CHANGE UP (ref 3.3–5)
ALP SERPL-CCNC: 98 U/L — SIGNIFICANT CHANGE UP (ref 40–120)
ALT FLD-CCNC: 6 U/L — LOW (ref 10–45)
ANION GAP SERPL CALC-SCNC: 12 MMOL/L — SIGNIFICANT CHANGE UP (ref 5–17)
APPEARANCE UR: CLEAR — SIGNIFICANT CHANGE UP
AST SERPL-CCNC: 9 U/L — LOW (ref 10–40)
BACTERIA # UR AUTO: NEGATIVE /HPF — SIGNIFICANT CHANGE UP
BASOPHILS # BLD AUTO: 0.05 K/UL — SIGNIFICANT CHANGE UP (ref 0–0.2)
BASOPHILS NFR BLD AUTO: 0.6 % — SIGNIFICANT CHANGE UP (ref 0–2)
BILIRUB SERPL-MCNC: 0.2 MG/DL — SIGNIFICANT CHANGE UP (ref 0.2–1.2)
BILIRUB UR-MCNC: NEGATIVE — SIGNIFICANT CHANGE UP
BUN SERPL-MCNC: 24 MG/DL — HIGH (ref 7–23)
CALCIUM SERPL-MCNC: 9.5 MG/DL — SIGNIFICANT CHANGE UP (ref 8.4–10.5)
CAST: 1 /LPF — SIGNIFICANT CHANGE UP (ref 0–4)
CHLORIDE SERPL-SCNC: 102 MMOL/L — SIGNIFICANT CHANGE UP (ref 96–108)
CO2 SERPL-SCNC: 24 MMOL/L — SIGNIFICANT CHANGE UP (ref 22–31)
COLOR SPEC: YELLOW — SIGNIFICANT CHANGE UP
CREAT SERPL-MCNC: 1.03 MG/DL — SIGNIFICANT CHANGE UP (ref 0.5–1.3)
DIFF PNL FLD: NEGATIVE — SIGNIFICANT CHANGE UP
EGFR: 75 ML/MIN/1.73M2 — SIGNIFICANT CHANGE UP
EOSINOPHIL # BLD AUTO: 0.16 K/UL — SIGNIFICANT CHANGE UP (ref 0–0.5)
EOSINOPHIL NFR BLD AUTO: 1.9 % — SIGNIFICANT CHANGE UP (ref 0–6)
GLUCOSE SERPL-MCNC: 92 MG/DL — SIGNIFICANT CHANGE UP (ref 70–99)
GLUCOSE UR QL: NEGATIVE MG/DL — SIGNIFICANT CHANGE UP
HCT VFR BLD CALC: 39.6 % — SIGNIFICANT CHANGE UP (ref 39–50)
HGB BLD-MCNC: 13.2 G/DL — SIGNIFICANT CHANGE UP (ref 13–17)
IMM GRANULOCYTES NFR BLD AUTO: 0.6 % — SIGNIFICANT CHANGE UP (ref 0–0.9)
KETONES UR-MCNC: NEGATIVE MG/DL — SIGNIFICANT CHANGE UP
LEUKOCYTE ESTERASE UR-ACNC: NEGATIVE — SIGNIFICANT CHANGE UP
LIDOCAIN IGE QN: 21 U/L — SIGNIFICANT CHANGE UP (ref 7–60)
LYMPHOCYTES # BLD AUTO: 1.35 K/UL — SIGNIFICANT CHANGE UP (ref 1–3.3)
LYMPHOCYTES # BLD AUTO: 15.7 % — SIGNIFICANT CHANGE UP (ref 13–44)
MCHC RBC-ENTMCNC: 30.3 PG — SIGNIFICANT CHANGE UP (ref 27–34)
MCHC RBC-ENTMCNC: 33.3 GM/DL — SIGNIFICANT CHANGE UP (ref 32–36)
MCV RBC AUTO: 91 FL — SIGNIFICANT CHANGE UP (ref 80–100)
MONOCYTES # BLD AUTO: 0.8 K/UL — SIGNIFICANT CHANGE UP (ref 0–0.9)
MONOCYTES NFR BLD AUTO: 9.3 % — SIGNIFICANT CHANGE UP (ref 2–14)
NEUTROPHILS # BLD AUTO: 6.21 K/UL — SIGNIFICANT CHANGE UP (ref 1.8–7.4)
NEUTROPHILS NFR BLD AUTO: 71.9 % — SIGNIFICANT CHANGE UP (ref 43–77)
NITRITE UR-MCNC: NEGATIVE — SIGNIFICANT CHANGE UP
NRBC # BLD: 0 /100 WBCS — SIGNIFICANT CHANGE UP (ref 0–0)
PH UR: 5.5 — SIGNIFICANT CHANGE UP (ref 5–8)
PLATELET # BLD AUTO: 284 K/UL — SIGNIFICANT CHANGE UP (ref 150–400)
POTASSIUM SERPL-MCNC: 4.6 MMOL/L — SIGNIFICANT CHANGE UP (ref 3.5–5.3)
POTASSIUM SERPL-SCNC: 4.6 MMOL/L — SIGNIFICANT CHANGE UP (ref 3.5–5.3)
PROT SERPL-MCNC: 7.1 G/DL — SIGNIFICANT CHANGE UP (ref 6–8.3)
PROT UR-MCNC: NEGATIVE MG/DL — SIGNIFICANT CHANGE UP
RBC # BLD: 4.35 M/UL — SIGNIFICANT CHANGE UP (ref 4.2–5.8)
RBC # FLD: 12.7 % — SIGNIFICANT CHANGE UP (ref 10.3–14.5)
RBC CASTS # UR COMP ASSIST: 0 /HPF — SIGNIFICANT CHANGE UP (ref 0–4)
SODIUM SERPL-SCNC: 138 MMOL/L — SIGNIFICANT CHANGE UP (ref 135–145)
SP GR SPEC: 1.02 — SIGNIFICANT CHANGE UP (ref 1–1.03)
SQUAMOUS # UR AUTO: 0 /HPF — SIGNIFICANT CHANGE UP (ref 0–5)
TROPONIN T, HIGH SENSITIVITY RESULT: 19 NG/L — SIGNIFICANT CHANGE UP (ref 0–51)
UROBILINOGEN FLD QL: 0.2 MG/DL — SIGNIFICANT CHANGE UP (ref 0.2–1)
WBC # BLD: 8.62 K/UL — SIGNIFICANT CHANGE UP (ref 3.8–10.5)
WBC # FLD AUTO: 8.62 K/UL — SIGNIFICANT CHANGE UP (ref 3.8–10.5)
WBC UR QL: 0 /HPF — SIGNIFICANT CHANGE UP (ref 0–5)

## 2024-03-11 PROCEDURE — 71046 X-RAY EXAM CHEST 2 VIEWS: CPT

## 2024-03-11 PROCEDURE — 83690 ASSAY OF LIPASE: CPT

## 2024-03-11 PROCEDURE — 84484 ASSAY OF TROPONIN QUANT: CPT

## 2024-03-11 PROCEDURE — 71046 X-RAY EXAM CHEST 2 VIEWS: CPT | Mod: 26

## 2024-03-11 PROCEDURE — 80053 COMPREHEN METABOLIC PANEL: CPT

## 2024-03-11 PROCEDURE — 81001 URINALYSIS AUTO W/SCOPE: CPT

## 2024-03-11 PROCEDURE — 99285 EMERGENCY DEPT VISIT HI MDM: CPT

## 2024-03-11 PROCEDURE — 87086 URINE CULTURE/COLONY COUNT: CPT

## 2024-03-11 PROCEDURE — 93005 ELECTROCARDIOGRAM TRACING: CPT

## 2024-03-11 PROCEDURE — 85025 COMPLETE CBC W/AUTO DIFF WBC: CPT

## 2024-03-11 PROCEDURE — 99285 EMERGENCY DEPT VISIT HI MDM: CPT | Mod: 25

## 2024-03-11 NOTE — ED PROVIDER NOTE - OBJECTIVE STATEMENT
75yo M with smoker, COPD, known lung nodules, alcohol abuse, HTN, HLD, depression, presenting with generalized weakness, dizziness, diaphoresis x 3-4 months. Not worse with exertion. Daughter reports she brought patient in today because he also looked pale. Also reports pain with swallowing, however has been following with ENT and had scope with ?inflammation, took abx, has upcoming f/u for further imaging. Reports pain to lower chest intermittently. Denies nausea, vomiting, diarrhea, fever/chills, recent illness.  Of note, has hemorrhoids with occasional blood in stool, no melena. Currently reports normal BMs.   Pt is Polish speaking with daughter assisting with interpretation.

## 2024-03-11 NOTE — ED PROVIDER NOTE - NSFOLLOWUPINSTRUCTIONS_ED_ALL_ED_FT
Follow up with the GASTROENTEROLOGIST for a follow up appointment. An ENDOSCOPY will be beneficial.     Ask your primary care doctor to refer you to a Speech and Swallow Therapist.

## 2024-03-11 NOTE — ED ADULT TRIAGE NOTE - CHIEF COMPLAINT QUOTE
generalized weakness, fatigue, dizziness since december. daughter brought him in today because he looked pale

## 2024-03-11 NOTE — ED ADULT NURSE NOTE - NSFALLHARMRISKINTERV_ED_ALL_ED

## 2024-03-11 NOTE — ED PROVIDER NOTE - CLINICAL SUMMARY MEDICAL DECISION MAKING FREE TEXT BOX
pt pw long standing orthostatic symptoms not associated with falls or syncope. also gerd symptoms ongoing for weeks. on ppi and h2 blocker.  has some exertional intolerance  labs reassuring. no sign of acs. given neg work up, pt appropriate for outpatient therapy  will help with outpatient appts

## 2024-03-11 NOTE — ED PROVIDER NOTE - PATIENT PORTAL LINK FT
You can access the FollowMyHealth Patient Portal offered by [facility] by registering at the following website: [email or web address]. By joining [employer]’s FollowMyHealth portal, you will also be able to view your health information using other applications (apps) compatible with our system.

## 2024-03-11 NOTE — ED ADULT NURSE NOTE - OBJECTIVE STATEMENT
pt 73 yo polosh speaking male presents with daughter with generalized weakness pt  hc htn hyperlipedemia depression copd lung nodule pt is a smoker hx of etoh states last drink September 2023 pt NSR on ekg denies chest pain some dizziness verbalized daughter states fall x 2 in January due to dizziness pt placed on cardiac monitor labs ending from q doc vitals stable on arrival to [place]

## 2024-03-11 NOTE — ED PROVIDER NOTE - ATTENDING CONTRIBUTION TO CARE
74M hx pad, depression, gerd pw diff swallowing, dizziness, epigastric pain.   Ongoing x1 month  on exam, rrr, ctab, gait stable  As interpreted by ED physician, ECG is NSR with normal intervals/axis, no changes in QRS, no ST/T changes.  pt needs swallow eval, needs cardiology clearance, and needs endoscopy  will activate referral coordinator   no more abx (ent rxed 3 times)

## 2024-03-11 NOTE — ED PROVIDER NOTE - RAPID ASSESSMENT
75yo M with smoker, COPD, known lung nodules, alcohol abuse, HTN, HLD, depression, presenting with generalized weakness, dizziness, diaphoresis x 3-4 months. Not worse with exertion. Daughter reports she brought patient in today because he also looked pale. Also reports pain with swallowing, however has been following with ENT and had scope with ?inflammation, took abx, has upcoming f/u for further imaging. Reports pain to lower chest intermittently. Denies nausea, vomiting, diarrhea, fever/chills, recent illness.  Of note, has hemorrhoids with occasional blood in stool, no melena. Currently reports normal BMs.   Pt is Polish speaking with daughter assisting with interpretation.      JOSE Trinh: Patient seen by myself in triage area for rapid assessment only. Full H&P and complete work-up to be performed in main emergency department by ED providers.

## 2024-03-12 LAB
CULTURE RESULTS: NO GROWTH — SIGNIFICANT CHANGE UP
SPECIMEN SOURCE: SIGNIFICANT CHANGE UP

## 2024-03-25 ENCOUNTER — APPOINTMENT (OUTPATIENT)
Dept: GASTROENTEROLOGY | Facility: CLINIC | Age: 74
End: 2024-03-25

## 2024-03-25 ENCOUNTER — APPOINTMENT (OUTPATIENT)
Dept: CARDIOLOGY | Facility: CLINIC | Age: 74
End: 2024-03-25

## 2024-12-27 NOTE — ED ADULT TRIAGE NOTE - SPO2 (%)
Prescription has been electronically faxed to your pharmacy.    Filled per medication protocol.   88
